# Patient Record
Sex: FEMALE | Race: OTHER | HISPANIC OR LATINO | Employment: PART TIME | URBAN - METROPOLITAN AREA
[De-identification: names, ages, dates, MRNs, and addresses within clinical notes are randomized per-mention and may not be internally consistent; named-entity substitution may affect disease eponyms.]

---

## 2023-11-14 ENCOUNTER — HOSPITAL ENCOUNTER (OUTPATIENT)
Facility: HOSPITAL | Age: 38
Discharge: HOME | End: 2023-11-16
Attending: SURGERY | Admitting: SURGERY
Payer: COMMERCIAL

## 2023-11-14 DIAGNOSIS — R05.9 COUGH, UNSPECIFIED TYPE: Primary | ICD-10-CM

## 2023-11-14 DIAGNOSIS — R10.13 EPIGASTRIC ABDOMINAL PAIN: ICD-10-CM

## 2023-11-14 DIAGNOSIS — K80.00 CALCULUS OF GALLBLADDER WITH ACUTE CHOLECYSTITIS WITHOUT OBSTRUCTION: ICD-10-CM

## 2023-11-14 LAB
ALBUMIN SERPL-MCNC: 3.4 G/DL (ref 3.5–5)
ALBUMIN/GLOB SERPL: 1 RATIO (ref 1.1–2)
ALP SERPL-CCNC: 114 UNIT/L (ref 40–150)
ALT SERPL-CCNC: 18 UNIT/L (ref 0–55)
APPEARANCE UR: CLEAR
AST SERPL-CCNC: 13 UNIT/L (ref 5–34)
B-HCG UR QL: NEGATIVE
BACTERIA #/AREA URNS AUTO: ABNORMAL /HPF
BASOPHILS # BLD AUTO: 0.04 X10(3)/MCL
BASOPHILS NFR BLD AUTO: 0.3 %
BILIRUB SERPL-MCNC: 0.5 MG/DL
BILIRUB UR QL STRIP.AUTO: NEGATIVE
BUN SERPL-MCNC: 7.1 MG/DL (ref 7–18.7)
CALCIUM SERPL-MCNC: 9.4 MG/DL (ref 8.4–10.2)
CHLORIDE SERPL-SCNC: 106 MMOL/L (ref 98–107)
CO2 SERPL-SCNC: 23 MMOL/L (ref 22–29)
COLOR UR AUTO: ABNORMAL
CREAT SERPL-MCNC: 0.64 MG/DL (ref 0.55–1.02)
CTP QC/QA: YES
EOSINOPHIL # BLD AUTO: 0.33 X10(3)/MCL (ref 0–0.9)
EOSINOPHIL NFR BLD AUTO: 2.7 %
ERYTHROCYTE [DISTWIDTH] IN BLOOD BY AUTOMATED COUNT: 12.1 % (ref 11.5–17)
GFR SERPLBLD CREATININE-BSD FMLA CKD-EPI: >60 MLS/MIN/1.73/M2
GLOBULIN SER-MCNC: 3.3 GM/DL (ref 2.4–3.5)
GLUCOSE SERPL-MCNC: 147 MG/DL (ref 74–100)
GLUCOSE UR QL STRIP.AUTO: NORMAL
HCT VFR BLD AUTO: 40.3 % (ref 37–47)
HGB BLD-MCNC: 12.8 G/DL (ref 12–16)
HYALINE CASTS #/AREA URNS LPF: ABNORMAL /LPF
IMM GRANULOCYTES # BLD AUTO: 0.05 X10(3)/MCL (ref 0–0.04)
IMM GRANULOCYTES NFR BLD AUTO: 0.4 %
KETONES UR QL STRIP.AUTO: NEGATIVE
LEUKOCYTE ESTERASE UR QL STRIP.AUTO: NEGATIVE
LIPASE SERPL-CCNC: 10 U/L
LYMPHOCYTES # BLD AUTO: 1.72 X10(3)/MCL (ref 0.6–4.6)
LYMPHOCYTES NFR BLD AUTO: 13.9 %
MCH RBC QN AUTO: 26.2 PG (ref 27–31)
MCHC RBC AUTO-ENTMCNC: 31.8 G/DL (ref 33–36)
MCV RBC AUTO: 82.4 FL (ref 80–94)
MONOCYTES # BLD AUTO: 0.73 X10(3)/MCL (ref 0.1–1.3)
MONOCYTES NFR BLD AUTO: 5.9 %
MUCOUS THREADS URNS QL MICRO: ABNORMAL /LPF
NEUTROPHILS # BLD AUTO: 9.48 X10(3)/MCL (ref 2.1–9.2)
NEUTROPHILS NFR BLD AUTO: 76.8 %
NITRITE UR QL STRIP.AUTO: NEGATIVE
NRBC BLD AUTO-RTO: 0 %
PH UR STRIP.AUTO: 5 [PH]
PLATELET # BLD AUTO: 186 X10(3)/MCL (ref 130–400)
PMV BLD AUTO: 12.8 FL (ref 7.4–10.4)
POTASSIUM SERPL-SCNC: 3.2 MMOL/L (ref 3.5–5.1)
PROT SERPL-MCNC: 6.7 GM/DL (ref 6.4–8.3)
PROT UR QL STRIP.AUTO: NEGATIVE
RBC # BLD AUTO: 4.89 X10(6)/MCL (ref 4.2–5.4)
RBC #/AREA URNS AUTO: ABNORMAL /HPF
RBC UR QL AUTO: ABNORMAL
SODIUM SERPL-SCNC: 139 MMOL/L (ref 136–145)
SP GR UR STRIP.AUTO: 1.02 (ref 1–1.03)
SQUAMOUS #/AREA URNS LPF: ABNORMAL /HPF
TROPONIN I SERPL-MCNC: <0.01 NG/ML (ref 0–0.04)
TSH SERPL-ACNC: <0.008 UIU/ML (ref 0.35–4.94)
UROBILINOGEN UR STRIP-ACNC: NORMAL
WBC # SPEC AUTO: 12.35 X10(3)/MCL (ref 4.5–11.5)
WBC #/AREA URNS AUTO: ABNORMAL /HPF

## 2023-11-14 PROCEDURE — 87651 STREP A DNA AMP PROBE: CPT | Performed by: PHYSICIAN ASSISTANT

## 2023-11-14 PROCEDURE — 63600175 PHARM REV CODE 636 W HCPCS: Performed by: PHYSICIAN ASSISTANT

## 2023-11-14 PROCEDURE — 96375 TX/PRO/DX INJ NEW DRUG ADDON: CPT

## 2023-11-14 PROCEDURE — 93005 ELECTROCARDIOGRAM TRACING: CPT

## 2023-11-14 PROCEDURE — 80053 COMPREHEN METABOLIC PANEL: CPT | Performed by: PHYSICIAN ASSISTANT

## 2023-11-14 PROCEDURE — 99285 EMERGENCY DEPT VISIT HI MDM: CPT | Mod: 25

## 2023-11-14 PROCEDURE — 84443 ASSAY THYROID STIM HORMONE: CPT | Performed by: PHYSICIAN ASSISTANT

## 2023-11-14 PROCEDURE — 83690 ASSAY OF LIPASE: CPT | Performed by: PHYSICIAN ASSISTANT

## 2023-11-14 PROCEDURE — 85025 COMPLETE CBC W/AUTO DIFF WBC: CPT | Performed by: PHYSICIAN ASSISTANT

## 2023-11-14 PROCEDURE — 86308 HETEROPHILE ANTIBODY SCREEN: CPT | Performed by: PHYSICIAN ASSISTANT

## 2023-11-14 PROCEDURE — 85610 PROTHROMBIN TIME: CPT | Performed by: PHYSICIAN ASSISTANT

## 2023-11-14 PROCEDURE — 25000003 PHARM REV CODE 250: Performed by: PHYSICIAN ASSISTANT

## 2023-11-14 PROCEDURE — 84484 ASSAY OF TROPONIN QUANT: CPT | Performed by: PHYSICIAN ASSISTANT

## 2023-11-14 PROCEDURE — 0241U COVID/RSV/FLU A&B PCR: CPT | Performed by: PHYSICIAN ASSISTANT

## 2023-11-14 PROCEDURE — 84439 ASSAY OF FREE THYROXINE: CPT | Performed by: PHYSICIAN ASSISTANT

## 2023-11-14 PROCEDURE — 81025 URINE PREGNANCY TEST: CPT | Performed by: PHYSICIAN ASSISTANT

## 2023-11-14 PROCEDURE — 96361 HYDRATE IV INFUSION ADD-ON: CPT

## 2023-11-14 PROCEDURE — 81001 URINALYSIS AUTO W/SCOPE: CPT | Performed by: PHYSICIAN ASSISTANT

## 2023-11-14 RX ORDER — ALBUTEROL SULFATE 90 UG/1
2 AEROSOL, METERED RESPIRATORY (INHALATION) EVERY 4 HOURS PRN
COMMUNITY
Start: 2023-11-05 | End: 2023-11-15

## 2023-11-14 RX ORDER — OXYBUTYNIN CHLORIDE 10 MG/1
10 TABLET, EXTENDED RELEASE ORAL 2 TIMES DAILY
COMMUNITY

## 2023-11-14 RX ORDER — KETOROLAC TROMETHAMINE 30 MG/ML
15 INJECTION, SOLUTION INTRAMUSCULAR; INTRAVENOUS
Status: COMPLETED | OUTPATIENT
Start: 2023-11-14 | End: 2023-11-14

## 2023-11-14 RX ORDER — BENZONATATE 100 MG/1
100 CAPSULE ORAL 3 TIMES DAILY PRN
COMMUNITY

## 2023-11-14 RX ORDER — ONDANSETRON 2 MG/ML
4 INJECTION INTRAMUSCULAR; INTRAVENOUS
Status: COMPLETED | OUTPATIENT
Start: 2023-11-14 | End: 2023-11-14

## 2023-11-14 RX ORDER — METHIMAZOLE 10 MG/1
5 TABLET ORAL 3 TIMES DAILY
COMMUNITY

## 2023-11-14 RX ADMIN — ONDANSETRON 4 MG: 2 INJECTION INTRAMUSCULAR; INTRAVENOUS at 11:11

## 2023-11-14 RX ADMIN — KETOROLAC TROMETHAMINE 15 MG: 30 INJECTION, SOLUTION INTRAMUSCULAR; INTRAVENOUS at 11:11

## 2023-11-14 RX ADMIN — SODIUM CHLORIDE 1000 ML: 9 INJECTION, SOLUTION INTRAVENOUS at 11:11

## 2023-11-15 ENCOUNTER — ANESTHESIA EVENT (OUTPATIENT)
Dept: SURGERY | Facility: HOSPITAL | Age: 38
End: 2023-11-15
Payer: COMMERCIAL

## 2023-11-15 ENCOUNTER — ANESTHESIA (OUTPATIENT)
Dept: SURGERY | Facility: HOSPITAL | Age: 38
End: 2023-11-15
Payer: COMMERCIAL

## 2023-11-15 LAB
ALBUMIN SERPL-MCNC: 3.2 G/DL (ref 3.5–5)
ALBUMIN/GLOB SERPL: 1.1 RATIO (ref 1.1–2)
ALP SERPL-CCNC: 106 UNIT/L (ref 40–150)
ALT SERPL-CCNC: 16 UNIT/L (ref 0–55)
AST SERPL-CCNC: 12 UNIT/L (ref 5–34)
BASOPHILS # BLD AUTO: 0.03 X10(3)/MCL
BASOPHILS NFR BLD AUTO: 0.3 %
BILIRUB SERPL-MCNC: 0.8 MG/DL
BUN SERPL-MCNC: 7.4 MG/DL (ref 7–18.7)
CALCIUM SERPL-MCNC: 9.1 MG/DL (ref 8.4–10.2)
CHLORIDE SERPL-SCNC: 111 MMOL/L (ref 98–107)
CO2 SERPL-SCNC: 23 MMOL/L (ref 22–29)
CREAT SERPL-MCNC: 0.66 MG/DL (ref 0.55–1.02)
EOSINOPHIL # BLD AUTO: 0.36 X10(3)/MCL (ref 0–0.9)
EOSINOPHIL NFR BLD AUTO: 3.4 %
ERYTHROCYTE [DISTWIDTH] IN BLOOD BY AUTOMATED COUNT: 12.3 % (ref 11.5–17)
FLUAV AG UPPER RESP QL IA.RAPID: NOT DETECTED
FLUBV AG UPPER RESP QL IA.RAPID: NOT DETECTED
GFR SERPLBLD CREATININE-BSD FMLA CKD-EPI: >60 MLS/MIN/1.73/M2
GLOBULIN SER-MCNC: 3 GM/DL (ref 2.4–3.5)
GLUCOSE SERPL-MCNC: 121 MG/DL (ref 74–100)
HCT VFR BLD AUTO: 38.2 % (ref 37–47)
HGB BLD-MCNC: 11.9 G/DL (ref 12–16)
HOLD SPECIMEN: NORMAL
IMM GRANULOCYTES # BLD AUTO: 0.03 X10(3)/MCL (ref 0–0.04)
IMM GRANULOCYTES NFR BLD AUTO: 0.3 %
INR PPP: 1
LYMPHOCYTES # BLD AUTO: 1.61 X10(3)/MCL (ref 0.6–4.6)
LYMPHOCYTES NFR BLD AUTO: 15 %
MCH RBC QN AUTO: 25.9 PG (ref 27–31)
MCHC RBC AUTO-ENTMCNC: 31.2 G/DL (ref 33–36)
MCV RBC AUTO: 83.2 FL (ref 80–94)
MONO SCR (OHS): NEGATIVE
MONOCYTES # BLD AUTO: 0.86 X10(3)/MCL (ref 0.1–1.3)
MONOCYTES NFR BLD AUTO: 8 %
NEUTROPHILS # BLD AUTO: 7.83 X10(3)/MCL (ref 2.1–9.2)
NEUTROPHILS NFR BLD AUTO: 73 %
NRBC BLD AUTO-RTO: 0 %
PLATELET # BLD AUTO: 176 X10(3)/MCL (ref 130–400)
PMV BLD AUTO: 12.7 FL (ref 7.4–10.4)
POTASSIUM SERPL-SCNC: 4.1 MMOL/L (ref 3.5–5.1)
PROT SERPL-MCNC: 6.2 GM/DL (ref 6.4–8.3)
PROTHROMBIN TIME: 13.3 SECONDS (ref 11.4–14)
RBC # BLD AUTO: 4.59 X10(6)/MCL (ref 4.2–5.4)
RSV A 5' UTR RNA NPH QL NAA+PROBE: NOT DETECTED
SARS-COV-2 RNA RESP QL NAA+PROBE: NOT DETECTED
SODIUM SERPL-SCNC: 141 MMOL/L (ref 136–145)
STREP A PCR (OHS): NOT DETECTED
T4 FREE SERPL-MCNC: 1.77 NG/DL (ref 0.7–1.48)
WBC # SPEC AUTO: 10.72 X10(3)/MCL (ref 4.5–11.5)

## 2023-11-15 PROCEDURE — D9220A PRA ANESTHESIA: Mod: ANES,,, | Performed by: ANESTHESIOLOGY

## 2023-11-15 PROCEDURE — 96376 TX/PRO/DX INJ SAME DRUG ADON: CPT | Mod: 59

## 2023-11-15 PROCEDURE — 63600175 PHARM REV CODE 636 W HCPCS

## 2023-11-15 PROCEDURE — 36000709 HC OR TIME LEV III EA ADD 15 MIN: Performed by: SURGERY

## 2023-11-15 PROCEDURE — 87040 BLOOD CULTURE FOR BACTERIA: CPT | Performed by: STUDENT IN AN ORGANIZED HEALTH CARE EDUCATION/TRAINING PROGRAM

## 2023-11-15 PROCEDURE — 63600175 PHARM REV CODE 636 W HCPCS: Performed by: STUDENT IN AN ORGANIZED HEALTH CARE EDUCATION/TRAINING PROGRAM

## 2023-11-15 PROCEDURE — 63600175 PHARM REV CODE 636 W HCPCS: Performed by: NURSE ANESTHETIST, CERTIFIED REGISTERED

## 2023-11-15 PROCEDURE — D9220A PRA ANESTHESIA: ICD-10-PCS | Mod: ANES,,, | Performed by: ANESTHESIOLOGY

## 2023-11-15 PROCEDURE — G0378 HOSPITAL OBSERVATION PER HR: HCPCS

## 2023-11-15 PROCEDURE — 96372 THER/PROPH/DIAG INJ SC/IM: CPT | Performed by: STUDENT IN AN ORGANIZED HEALTH CARE EDUCATION/TRAINING PROGRAM

## 2023-11-15 PROCEDURE — 96365 THER/PROPH/DIAG IV INF INIT: CPT

## 2023-11-15 PROCEDURE — 25000003 PHARM REV CODE 250: Performed by: SURGERY

## 2023-11-15 PROCEDURE — 25000003 PHARM REV CODE 250: Performed by: STUDENT IN AN ORGANIZED HEALTH CARE EDUCATION/TRAINING PROGRAM

## 2023-11-15 PROCEDURE — 37000009 HC ANESTHESIA EA ADD 15 MINS: Performed by: SURGERY

## 2023-11-15 PROCEDURE — 85025 COMPLETE CBC W/AUTO DIFF WBC: CPT | Performed by: STUDENT IN AN ORGANIZED HEALTH CARE EDUCATION/TRAINING PROGRAM

## 2023-11-15 PROCEDURE — 96366 THER/PROPH/DIAG IV INF ADDON: CPT | Mod: 59

## 2023-11-15 PROCEDURE — 36000708 HC OR TIME LEV III 1ST 15 MIN: Performed by: SURGERY

## 2023-11-15 PROCEDURE — 25000003 PHARM REV CODE 250: Performed by: NURSE ANESTHETIST, CERTIFIED REGISTERED

## 2023-11-15 PROCEDURE — 96375 TX/PRO/DX INJ NEW DRUG ADDON: CPT

## 2023-11-15 PROCEDURE — D9220A PRA ANESTHESIA: ICD-10-PCS | Mod: CRNA,,, | Performed by: NURSE ANESTHETIST, CERTIFIED REGISTERED

## 2023-11-15 PROCEDURE — 71000033 HC RECOVERY, INTIAL HOUR: Performed by: SURGERY

## 2023-11-15 PROCEDURE — 96361 HYDRATE IV INFUSION ADD-ON: CPT | Mod: 59

## 2023-11-15 PROCEDURE — 27201423 OPTIME MED/SURG SUP & DEVICES STERILE SUPPLY: Performed by: SURGERY

## 2023-11-15 PROCEDURE — 88304 TISSUE EXAM BY PATHOLOGIST: CPT | Mod: TC | Performed by: SURGERY

## 2023-11-15 PROCEDURE — 94761 N-INVAS EAR/PLS OXIMETRY MLT: CPT

## 2023-11-15 PROCEDURE — 63600175 PHARM REV CODE 636 W HCPCS: Performed by: SURGERY

## 2023-11-15 PROCEDURE — 96361 HYDRATE IV INFUSION ADD-ON: CPT

## 2023-11-15 PROCEDURE — 99900035 HC TECH TIME PER 15 MIN (STAT)

## 2023-11-15 PROCEDURE — 37000008 HC ANESTHESIA 1ST 15 MINUTES: Performed by: SURGERY

## 2023-11-15 PROCEDURE — D9220A PRA ANESTHESIA: Mod: CRNA,,, | Performed by: NURSE ANESTHETIST, CERTIFIED REGISTERED

## 2023-11-15 PROCEDURE — 96372 THER/PROPH/DIAG INJ SC/IM: CPT | Performed by: PHYSICIAN ASSISTANT

## 2023-11-15 PROCEDURE — 87040 BLOOD CULTURE FOR BACTERIA: CPT | Mod: 91 | Performed by: SURGERY

## 2023-11-15 PROCEDURE — 63600175 PHARM REV CODE 636 W HCPCS: Performed by: PHYSICIAN ASSISTANT

## 2023-11-15 PROCEDURE — C9113 INJ PANTOPRAZOLE SODIUM, VIA: HCPCS

## 2023-11-15 PROCEDURE — 80053 COMPREHEN METABOLIC PANEL: CPT | Performed by: STUDENT IN AN ORGANIZED HEALTH CARE EDUCATION/TRAINING PROGRAM

## 2023-11-15 PROCEDURE — 63600175 PHARM REV CODE 636 W HCPCS: Performed by: ANESTHESIOLOGY

## 2023-11-15 RX ORDER — PROMETHAZINE HYDROCHLORIDE 25 MG/ML
INJECTION, SOLUTION INTRAMUSCULAR; INTRAVENOUS
Status: COMPLETED
Start: 2023-11-15 | End: 2023-11-15

## 2023-11-15 RX ORDER — ONDANSETRON 4 MG/1
8 TABLET, ORALLY DISINTEGRATING ORAL EVERY 8 HOURS PRN
Status: DISCONTINUED | OUTPATIENT
Start: 2023-11-15 | End: 2023-11-16 | Stop reason: HOSPADM

## 2023-11-15 RX ORDER — BUPIVACAINE HYDROCHLORIDE 2.5 MG/ML
INJECTION, SOLUTION EPIDURAL; INFILTRATION; INTRACAUDAL
Status: DISCONTINUED | OUTPATIENT
Start: 2023-11-15 | End: 2023-11-15 | Stop reason: HOSPADM

## 2023-11-15 RX ORDER — HEPARIN SODIUM 5000 [USP'U]/ML
5000 INJECTION, SOLUTION INTRAVENOUS; SUBCUTANEOUS ONCE
Status: COMPLETED | OUTPATIENT
Start: 2023-11-15 | End: 2023-11-15

## 2023-11-15 RX ORDER — DICYCLOMINE HYDROCHLORIDE 10 MG/ML
20 INJECTION INTRAMUSCULAR
Status: COMPLETED | OUTPATIENT
Start: 2023-11-15 | End: 2023-11-15

## 2023-11-15 RX ORDER — CEFAZOLIN SODIUM 1 G/3ML
INJECTION, POWDER, FOR SOLUTION INTRAMUSCULAR; INTRAVENOUS
Status: DISCONTINUED | OUTPATIENT
Start: 2023-11-15 | End: 2023-11-15

## 2023-11-15 RX ORDER — LIDOCAINE HYDROCHLORIDE 20 MG/ML
INJECTION, SOLUTION EPIDURAL; INFILTRATION; INTRACAUDAL; PERINEURAL
Status: DISCONTINUED | OUTPATIENT
Start: 2023-11-15 | End: 2023-11-15

## 2023-11-15 RX ORDER — MORPHINE SULFATE 2 MG/ML
2 INJECTION, SOLUTION INTRAMUSCULAR; INTRAVENOUS EVERY 5 MIN PRN
Status: DISCONTINUED | OUTPATIENT
Start: 2023-11-15 | End: 2023-11-16

## 2023-11-15 RX ORDER — SODIUM CHLORIDE 0.9 % (FLUSH) 0.9 %
10 SYRINGE (ML) INJECTION
Status: DISCONTINUED | OUTPATIENT
Start: 2023-11-15 | End: 2023-11-16 | Stop reason: HOSPADM

## 2023-11-15 RX ORDER — KETOROLAC TROMETHAMINE 30 MG/ML
INJECTION, SOLUTION INTRAMUSCULAR; INTRAVENOUS
Status: DISCONTINUED | OUTPATIENT
Start: 2023-11-15 | End: 2023-11-15

## 2023-11-15 RX ORDER — OXYCODONE HYDROCHLORIDE 5 MG/1
5 TABLET ORAL EVERY 4 HOURS PRN
Status: DISCONTINUED | OUTPATIENT
Start: 2023-11-15 | End: 2023-11-16 | Stop reason: HOSPADM

## 2023-11-15 RX ORDER — ONDANSETRON 2 MG/ML
4 INJECTION INTRAMUSCULAR; INTRAVENOUS EVERY 12 HOURS PRN
Status: DISCONTINUED | OUTPATIENT
Start: 2023-11-15 | End: 2023-11-16 | Stop reason: HOSPADM

## 2023-11-15 RX ORDER — ROCURONIUM BROMIDE 10 MG/ML
INJECTION, SOLUTION INTRAVENOUS
Status: DISCONTINUED | OUTPATIENT
Start: 2023-11-15 | End: 2023-11-15

## 2023-11-15 RX ORDER — SODIUM CHLORIDE 9 MG/ML
INJECTION, SOLUTION INTRAVENOUS
Status: DISCONTINUED | OUTPATIENT
Start: 2023-11-15 | End: 2023-11-16 | Stop reason: HOSPADM

## 2023-11-15 RX ORDER — ACETAMINOPHEN 325 MG/1
650 TABLET ORAL EVERY 4 HOURS
Status: DISCONTINUED | OUTPATIENT
Start: 2023-11-15 | End: 2023-11-16 | Stop reason: HOSPADM

## 2023-11-15 RX ORDER — FENTANYL CITRATE 50 UG/ML
INJECTION, SOLUTION INTRAMUSCULAR; INTRAVENOUS
Status: DISCONTINUED | OUTPATIENT
Start: 2023-11-15 | End: 2023-11-15

## 2023-11-15 RX ORDER — DEXAMETHASONE SODIUM PHOSPHATE 4 MG/ML
INJECTION, SOLUTION INTRA-ARTICULAR; INTRALESIONAL; INTRAMUSCULAR; INTRAVENOUS; SOFT TISSUE
Status: DISCONTINUED | OUTPATIENT
Start: 2023-11-15 | End: 2023-11-15

## 2023-11-15 RX ORDER — ONDANSETRON 2 MG/ML
4 INJECTION INTRAMUSCULAR; INTRAVENOUS DAILY PRN
Status: DISCONTINUED | OUTPATIENT
Start: 2023-11-15 | End: 2023-11-16 | Stop reason: HOSPADM

## 2023-11-15 RX ORDER — SODIUM CHLORIDE, SODIUM LACTATE, POTASSIUM CHLORIDE, CALCIUM CHLORIDE 600; 310; 30; 20 MG/100ML; MG/100ML; MG/100ML; MG/100ML
INJECTION, SOLUTION INTRAVENOUS CONTINUOUS
Status: ACTIVE | OUTPATIENT
Start: 2023-11-15 | End: 2023-11-15

## 2023-11-15 RX ORDER — PROPRANOLOL HYDROCHLORIDE 20 MG/1
60 TABLET ORAL 2 TIMES DAILY
Status: DISCONTINUED | OUTPATIENT
Start: 2023-11-15 | End: 2023-11-16 | Stop reason: HOSPADM

## 2023-11-15 RX ORDER — MEPERIDINE HYDROCHLORIDE 25 MG/ML
12.5 INJECTION INTRAMUSCULAR; INTRAVENOUS; SUBCUTANEOUS EVERY 10 MIN PRN
Status: DISPENSED | OUTPATIENT
Start: 2023-11-15 | End: 2023-11-15

## 2023-11-15 RX ORDER — GUAIFENESIN/DEXTROMETHORPHAN 100-10MG/5
5 SYRUP ORAL EVERY 4 HOURS PRN
Status: DISCONTINUED | OUTPATIENT
Start: 2023-11-15 | End: 2023-11-16 | Stop reason: HOSPADM

## 2023-11-15 RX ORDER — PROPOFOL 10 MG/ML
VIAL (ML) INTRAVENOUS
Status: DISCONTINUED | OUTPATIENT
Start: 2023-11-15 | End: 2023-11-15

## 2023-11-15 RX ORDER — OXYCODONE HYDROCHLORIDE 5 MG/1
5 TABLET ORAL
Status: DISCONTINUED | OUTPATIENT
Start: 2023-11-15 | End: 2023-11-16

## 2023-11-15 RX ORDER — ACETAMINOPHEN 325 MG/1
650 TABLET ORAL EVERY 8 HOURS PRN
Status: DISCONTINUED | OUTPATIENT
Start: 2023-11-15 | End: 2023-11-16 | Stop reason: HOSPADM

## 2023-11-15 RX ORDER — METHIMAZOLE 5 MG/1
5 TABLET ORAL 3 TIMES DAILY
Status: DISCONTINUED | OUTPATIENT
Start: 2023-11-15 | End: 2023-11-16 | Stop reason: HOSPADM

## 2023-11-15 RX ORDER — MIDAZOLAM HYDROCHLORIDE 1 MG/ML
2 INJECTION INTRAMUSCULAR; INTRAVENOUS ONCE AS NEEDED
Status: COMPLETED | OUTPATIENT
Start: 2023-11-15 | End: 2023-11-15

## 2023-11-15 RX ORDER — SODIUM CHLORIDE, SODIUM LACTATE, POTASSIUM CHLORIDE, CALCIUM CHLORIDE 600; 310; 30; 20 MG/100ML; MG/100ML; MG/100ML; MG/100ML
INJECTION, SOLUTION INTRAVENOUS CONTINUOUS
Status: DISCONTINUED | OUTPATIENT
Start: 2023-11-15 | End: 2023-11-15

## 2023-11-15 RX ORDER — MORPHINE SULFATE 2 MG/ML
INJECTION, SOLUTION INTRAMUSCULAR; INTRAVENOUS
Status: COMPLETED
Start: 2023-11-15 | End: 2023-11-15

## 2023-11-15 RX ORDER — TALC
6 POWDER (GRAM) TOPICAL NIGHTLY PRN
Status: DISCONTINUED | OUTPATIENT
Start: 2023-11-15 | End: 2023-11-16 | Stop reason: HOSPADM

## 2023-11-15 RX ORDER — PANTOPRAZOLE SODIUM 40 MG/10ML
40 INJECTION, POWDER, LYOPHILIZED, FOR SOLUTION INTRAVENOUS DAILY
Status: DISCONTINUED | OUTPATIENT
Start: 2023-11-15 | End: 2023-11-16 | Stop reason: HOSPADM

## 2023-11-15 RX ORDER — OXYBUTYNIN CHLORIDE 5 MG/1
10 TABLET, EXTENDED RELEASE ORAL 2 TIMES DAILY
Status: DISCONTINUED | OUTPATIENT
Start: 2023-11-15 | End: 2023-11-16 | Stop reason: HOSPADM

## 2023-11-15 RX ORDER — CEFAZOLIN SODIUM 2 G/50ML
2 SOLUTION INTRAVENOUS ONCE
Status: COMPLETED | OUTPATIENT
Start: 2023-11-15 | End: 2023-11-15

## 2023-11-15 RX ORDER — PROMETHAZINE HYDROCHLORIDE 25 MG/ML
12.5 INJECTION, SOLUTION INTRAMUSCULAR; INTRAVENOUS ONCE
Status: COMPLETED | OUTPATIENT
Start: 2023-11-15 | End: 2023-11-15

## 2023-11-15 RX ORDER — POTASSIUM CHLORIDE 20 MEQ/1
40 TABLET, EXTENDED RELEASE ORAL 2 TIMES DAILY
Status: COMPLETED | OUTPATIENT
Start: 2023-11-15 | End: 2023-11-15

## 2023-11-15 RX ORDER — ENOXAPARIN SODIUM 100 MG/ML
40 INJECTION SUBCUTANEOUS EVERY 24 HOURS
Status: DISCONTINUED | OUTPATIENT
Start: 2023-11-15 | End: 2023-11-16 | Stop reason: HOSPADM

## 2023-11-15 RX ORDER — TRAMADOL HYDROCHLORIDE 50 MG/1
50 TABLET ORAL EVERY 4 HOURS PRN
Status: DISCONTINUED | OUTPATIENT
Start: 2023-11-15 | End: 2023-11-16 | Stop reason: HOSPADM

## 2023-11-15 RX ORDER — ALBUTEROL SULFATE 90 UG/1
2 AEROSOL, METERED RESPIRATORY (INHALATION) EVERY 4 HOURS PRN
Status: DISCONTINUED | OUTPATIENT
Start: 2023-11-15 | End: 2023-11-16 | Stop reason: HOSPADM

## 2023-11-15 RX ADMIN — PROPOFOL 200 MG: 10 INJECTION, EMULSION INTRAVENOUS at 01:11

## 2023-11-15 RX ADMIN — PROMETHAZINE HYDROCHLORIDE 12.5 MG: 25 INJECTION, SOLUTION INTRAMUSCULAR; INTRAVENOUS at 02:11

## 2023-11-15 RX ADMIN — SODIUM CHLORIDE, POTASSIUM CHLORIDE, SODIUM LACTATE AND CALCIUM CHLORIDE: 600; 310; 30; 20 INJECTION, SOLUTION INTRAVENOUS at 12:11

## 2023-11-15 RX ADMIN — METHIMAZOLE 5 MG: 5 TABLET ORAL at 09:11

## 2023-11-15 RX ADMIN — TRAMADOL HYDROCHLORIDE 50 MG: 50 TABLET, COATED ORAL at 07:11

## 2023-11-15 RX ADMIN — CEFAZOLIN 2 G: 330 INJECTION, POWDER, FOR SOLUTION INTRAMUSCULAR; INTRAVENOUS at 01:11

## 2023-11-15 RX ADMIN — ACETAMINOPHEN 650 MG: 325 TABLET, FILM COATED ORAL at 05:11

## 2023-11-15 RX ADMIN — ENOXAPARIN SODIUM 40 MG: 40 INJECTION SUBCUTANEOUS at 05:11

## 2023-11-15 RX ADMIN — SUGAMMADEX 200 MG: 100 INJECTION, SOLUTION INTRAVENOUS at 02:11

## 2023-11-15 RX ADMIN — LIDOCAINE HYDROCHLORIDE 50 MG: 20 INJECTION, SOLUTION EPIDURAL; INFILTRATION; INTRACAUDAL; PERINEURAL at 01:11

## 2023-11-15 RX ADMIN — DEXAMETHASONE SODIUM PHOSPHATE 8 MG: 4 INJECTION, SOLUTION INTRA-ARTICULAR; INTRALESIONAL; INTRAMUSCULAR; INTRAVENOUS; SOFT TISSUE at 01:11

## 2023-11-15 RX ADMIN — SODIUM CHLORIDE, POTASSIUM CHLORIDE, SODIUM LACTATE AND CALCIUM CHLORIDE: 600; 310; 30; 20 INJECTION, SOLUTION INTRAVENOUS at 03:11

## 2023-11-15 RX ADMIN — PROMETHAZINE HYDROCHLORIDE 12.5 MG: 25 INJECTION INTRAMUSCULAR; INTRAVENOUS at 02:11

## 2023-11-15 RX ADMIN — POTASSIUM CHLORIDE 40 MEQ: 1500 TABLET, EXTENDED RELEASE ORAL at 09:11

## 2023-11-15 RX ADMIN — SODIUM CHLORIDE, POTASSIUM CHLORIDE, SODIUM LACTATE AND CALCIUM CHLORIDE: 600; 310; 30; 20 INJECTION, SOLUTION INTRAVENOUS at 02:11

## 2023-11-15 RX ADMIN — POTASSIUM CHLORIDE 40 MEQ: 1500 TABLET, EXTENDED RELEASE ORAL at 08:11

## 2023-11-15 RX ADMIN — ACETAMINOPHEN 650 MG: 325 TABLET, FILM COATED ORAL at 02:11

## 2023-11-15 RX ADMIN — PANTOPRAZOLE SODIUM 40 MG: 40 INJECTION, POWDER, FOR SOLUTION INTRAVENOUS at 08:11

## 2023-11-15 RX ADMIN — KETOROLAC TROMETHAMINE 30 MG: 30 INJECTION, SOLUTION INTRAMUSCULAR at 01:11

## 2023-11-15 RX ADMIN — MEPERIDINE HYDROCHLORIDE 12.5 MG: 25 INJECTION INTRAMUSCULAR; INTRAVENOUS; SUBCUTANEOUS at 02:11

## 2023-11-15 RX ADMIN — SODIUM CHLORIDE: 9 INJECTION, SOLUTION INTRAVENOUS at 04:11

## 2023-11-15 RX ADMIN — OXYBUTYNIN CHLORIDE 10 MG: 5 TABLET, EXTENDED RELEASE ORAL at 04:11

## 2023-11-15 RX ADMIN — ACETAMINOPHEN 650 MG: 325 TABLET, FILM COATED ORAL at 09:11

## 2023-11-15 RX ADMIN — PIPERACILLIN AND TAZOBACTAM 4.5 G: 4; .5 INJECTION, POWDER, LYOPHILIZED, FOR SOLUTION INTRAVENOUS; PARENTERAL at 04:11

## 2023-11-15 RX ADMIN — MORPHINE SULFATE 2 MG: 2 INJECTION, SOLUTION INTRAMUSCULAR; INTRAVENOUS at 02:11

## 2023-11-15 RX ADMIN — PROPRANOLOL HYDROCHLORIDE 60 MG: 20 TABLET ORAL at 09:11

## 2023-11-15 RX ADMIN — CEFAZOLIN SODIUM 2 G: 2 SOLUTION INTRAVENOUS at 05:11

## 2023-11-15 RX ADMIN — ONDANSETRON 4 MG: 2 INJECTION INTRAMUSCULAR; INTRAVENOUS at 01:11

## 2023-11-15 RX ADMIN — MIDAZOLAM HYDROCHLORIDE 2 MG: 1 INJECTION, SOLUTION INTRAMUSCULAR; INTRAVENOUS at 12:11

## 2023-11-15 RX ADMIN — OXYCODONE HYDROCHLORIDE 5 MG: 5 TABLET ORAL at 09:11

## 2023-11-15 RX ADMIN — SODIUM CHLORIDE, POTASSIUM CHLORIDE, SODIUM LACTATE AND CALCIUM CHLORIDE 1000 ML: 600; 310; 30; 20 INJECTION, SOLUTION INTRAVENOUS at 03:11

## 2023-11-15 RX ADMIN — FENTANYL CITRATE 100 MCG: 50 INJECTION, SOLUTION INTRAMUSCULAR; INTRAVENOUS at 01:11

## 2023-11-15 RX ADMIN — OXYBUTYNIN CHLORIDE 10 MG: 5 TABLET, EXTENDED RELEASE ORAL at 09:11

## 2023-11-15 RX ADMIN — DICYCLOMINE HYDROCHLORIDE 20 MG: 20 INJECTION, SOLUTION INTRAMUSCULAR at 12:11

## 2023-11-15 RX ADMIN — SODIUM CHLORIDE, POTASSIUM CHLORIDE, SODIUM LACTATE AND CALCIUM CHLORIDE: 600; 310; 30; 20 INJECTION, SOLUTION INTRAVENOUS at 09:11

## 2023-11-15 RX ADMIN — ROCURONIUM BROMIDE 50 MG: 10 INJECTION INTRAVENOUS at 01:11

## 2023-11-15 RX ADMIN — HEPARIN SODIUM 5000 UNITS: 5000 INJECTION, SOLUTION INTRAVENOUS; SUBCUTANEOUS at 12:11

## 2023-11-15 NOTE — H&P
[facility]/General Surgery  CONSULT NOTE  Chief Complaint:   Abdominal Pain/ Cough     History of Present Illness:  Apoorva Garcia is a 38 y.o. female with PMHx of hyperthyroid and H.Pylori who presents with a one day hx of acute onset abdominal pain right after breakfast without associated N/V. Pt also c/o of a 2 week long hx of cough. US performed which showed 0.9cm stone in the neck of the GB with reported + Dunham sign. PT has had two c-sections but no other Past surgical History. Denies Fever, chills, diarrhea, constipation.     Review of Systems:  Negative other than stated in HPI on 10 point review of systems.     Allergies:  Review of patient's allergies indicates:   Allergen Reactions    Cefdinir Rash       Home Medications:  No current facility-administered medications on file prior to encounter.     Current Outpatient Medications on File Prior to Encounter   Medication Sig    albuterol (PROVENTIL/VENTOLIN HFA) 90 mcg/actuation inhaler Inhale 2 puffs into the lungs every 4 (four) hours as needed.    benzonatate (TESSALON) 100 MG capsule Take 100 mg by mouth 3 (three) times daily as needed for Cough.    methIMAzole (TAPAZOLE) 10 MG Tab Take 5 mg by mouth 3 (three) times daily.    oxybutynin (DITROPAN-XL) 10 MG 24 hr tablet Take 10 mg by mouth 2 (two) times a day.    propranoloL 120 mg Cp24 Take by mouth.       Past Medical History:  History reviewed. No pertinent past medical history.  Hypothyroidism   H. Pylori (Tested + in , finished triple therapy)     Past Surgical History:  History reviewed. No pertinent surgical history.   x2 ()     Family History:   History reviewed. No pertinent family history.    Social History:   Social History     Tobacco Use    Smoking status: Never    Smokeless tobacco: Never   Substance Use Topics    Alcohol use: Never    Drug use: Not Currently        Vital Signs:  Temp: 98.5 °F (36.9 °C) (11/15/23 0031)  Pulse: 81 (11/15/23 0104)  Resp: 18 (11/15/23  "0104)  BP: 117/60 (11/15/23 0104)  SpO2: 98 % (11/15/23 0104)    Physical Exam:   Gen: NAD, AAOx3, answering questions appropriately  HEENT: Atraumatic   CV: RR  Resp: NWOB  Abd: S, epigastric tenderness, Dunham Sign -, No rebound tenderness or guarding       Labs:  Renal:  Recent Labs     11/14/23 2256   BUN 7.1   CREATININE 0.64     No results for input(s): "LACTIC" in the last 72 hours.  FENGI:  Recent Labs     11/14/23 2256      K 3.2*   CO2 23   CALCIUM 9.4   ALBUMIN 3.4*   BILITOT 0.5   AST 13   ALKPHOS 114   ALT 18     Heme:  Recent Labs     11/14/23 2255   HGB 12.8   HCT 40.3        ID:  Recent Labs     11/14/23 2255   WBC 12.35*     CBG:  Recent Labs     11/14/23 2256   GLUCOSE 147*      Cardiovascular:  Recent Labs   Lab 11/14/23 2255   TROPONINI <0.010     I have reviewed all pertinent lab results within the past 24 hours.    Imaging:  RUQ US 11/15  START OF REPORT:   Technique: Limited right upper quadrant abdominal ultrasound was performed.     Comparison: None.     Clinical History: RUQ pain, positive dunham sign.     Findings:   Liver: Unremarkable with no intrahepatic duct dilatation. The liver measures 14.9 centimeters in greatest dimension and demonstrates slight increased echogenicity which may reflect fatty infiltration. Correlate with clinical and laboratory findings.   Mass: No discrete mass is seen.   Cyst: No definitive cyst is seen.   Biliary ducts: The common bile duct is within normal limits at 5.2 mm in diameter.   Gallbladder: There is a single gallstone in the gallbladder neck measuring 0.9 cm. The gallbladder wall measures 1.8 mm in thickness. There is no pericholecystic fluid. The sonographic [place] sign is positive. This could represent acute cholecystitis.   Pancreas: No mass, pancreatitis, or ductal dilatation.   Kidneys: No stones hydronephrosis or mass.   Right kidney:   Dimensions: The right kidney measures 10 sagittal by 4.8 by 4.8 in dimension and appears " unremarkable.   Vascular:   IVC: The IVC is within normal limits.       Impression:   1. There is a single gallstone in the gallbladder neck measuring 0.9 cm. The sonographic [place] sign is positive. This could represent acute cholecystitis.   I have reviewed all pertinent imaging results/findings within the past 24 hours.    Micro/Path/Other:  Microbiology Results (last 7 days)       ** No results found for the last 168 hours. **           Specimen (168h ago, onward)      None                 ASSESSMENT:     Apoorva Garcia is a 38 y.o. female with PMHx of hyperthyroidism and H.Pylori who presentes with one day of Epigastric/RUQ pain and cough w/ US showing .9cm stone in the neck of the GB. Pt w/ leukocytosis of 12 c/f early Acute cholecystitis     PLAN:     - Admit for obs   -Will plan on Lap marj today 11/16 pending OR availability   -NPO   -mIVF   -IV Zosyn   -PPI   -MM pain control     Franco Crockett MD  [facility] General Surgery PGY-1  2:27 AM

## 2023-11-15 NOTE — PROGRESS NOTES
11/15/23 1414   Missed Time Reason   OT Attempted Eval Date 11/15/23   OT Attempted Eval Time 1326   Missed Treatment Reason Off the floor for procedure/surgery

## 2023-11-15 NOTE — TRANSFER OF CARE
"Anesthesia Transfer of Care Note    Patient: Apoorva Garcia    Procedure(s) Performed: Procedure(s) (LRB):  CHOLECYSTECTOMY, LAPAROSCOPIC (N/A)    Patient location: PACU    Transport from OR: Transported from OR on room air with adequate spontaneous ventilation    Post pain: adequate analgesia    Post assessment: no apparent anesthetic complications    Post vital signs: stable    Level of consciousness: awake    Nausea/Vomiting: no nausea/vomiting    Complications: none    Transfer of care protocol was followed      Last vitals: Visit Vitals  /79   Pulse 60   Temp 36.3 °C (97.3 °F) (Temporal)   Resp 20   Ht 5' 6" (1.676 m)   Wt 70 kg (154 lb 5.2 oz)   LMP 11/07/2023 (Approximate)   SpO2 100%   Breastfeeding No   BMI 24.91 kg/m²     "

## 2023-11-15 NOTE — ANESTHESIA PREPROCEDURE EVALUATION
11/15/2023  Apoorva Garcia is a 38 y.o., female with PMHx of hyperthyroidism presents for laparoscopic cholecystectomy.    Propanolol--last dose    Active Ambulatory Problems     Diagnosis Date Noted    No Active Ambulatory Problems     Resolved Ambulatory Problems     Diagnosis Date Noted    No Resolved Ambulatory Problems     No Additional Past Medical History           Pre-op Assessment    I have reviewed the NPO Status.      Review of Systems  Anesthesia Hx:  No problems with previous Anesthesia                Social:  Non-Smoker       Cardiovascular:  Cardiovascular Normal                                            Pulmonary:  Pulmonary Normal                       Renal/:  Renal/ Normal                 Hepatic/GI:  Hepatic/GI Normal                 Neurological:  Neurology Normal                                      Endocrine:    Hyperthyroidism           Vitals:    11/15/23 0643 11/15/23 0717 11/15/23 1030 11/15/23 1214   BP: (!) 100/55  102/63 128/79   Pulse: 63 72 (!) 59 60   Resp:  18  20   Temp: 36.4 °C (97.5 °F)  36.4 °C (97.6 °F) 36.3 °C (97.3 °F)   TempSrc: Oral  Oral Temporal   SpO2: 99% 99% 100% 100%   Weight:       Height:             Physical Exam  General: Alert, Cooperative and Well nourished    Airway:  Mallampati: II   Mouth Opening: Normal  TM Distance: Normal  Tongue: Normal  Neck ROM: Normal ROM    Dental:  Intact    Chest/Lungs:  Normal Respiratory Rate, Clear to auscultation    Heart:  Rate: Normal  Rhythm: Regular Rhythm  Sounds: Normal       Latest Reference Range & Units 11/14/23 23:18   Preg Test, Ur Negative  Negative     Lab Results   Component Value Date    WBC 10.72 11/15/2023    HGB 11.9 (L) 11/15/2023    HCT 38.2 11/15/2023    MCV 83.2 11/15/2023     11/15/2023       CMP  Sodium Level   Date Value Ref Range Status   11/15/2023 141 136 - 145 mmol/L Final      Potassium Level   Date Value Ref Range Status   11/15/2023 4.1 3.5 - 5.1 mmol/L Final     Carbon Dioxide   Date Value Ref Range Status   11/15/2023 23 22 - 29 mmol/L Final     Blood Urea Nitrogen   Date Value Ref Range Status   11/15/2023 7.4 7.0 - 18.7 mg/dL Final     Creatinine   Date Value Ref Range Status   11/15/2023 0.66 0.55 - 1.02 mg/dL Final     Calcium Level Total   Date Value Ref Range Status   11/15/2023 9.1 8.4 - 10.2 mg/dL Final     Albumin Level   Date Value Ref Range Status   11/15/2023 3.2 (L) 3.5 - 5.0 g/dL Final     Bilirubin Total   Date Value Ref Range Status   11/15/2023 0.8 <=1.5 mg/dL Final     Alkaline Phosphatase   Date Value Ref Range Status   11/15/2023 106 40 - 150 unit/L Final     Aspartate Aminotransferase   Date Value Ref Range Status   11/15/2023 12 5 - 34 unit/L Final     Alanine Aminotransferase   Date Value Ref Range Status   11/15/2023 16 0 - 55 unit/L Final     eGFR   Date Value Ref Range Status   11/15/2023 >60 mls/min/1.73/m2 Final           Anesthesia Plan  Type of Anesthesia, risks & benefits discussed:    Anesthesia Type: Gen ETT  Intra-op Monitoring Plan: Standard ASA Monitors  Post Op Pain Control Plan: IV/PO Opioids PRN  Induction:  IV  Airway Plan: Direct  Informed Consent: Informed consent signed with the Patient and all parties understand the risks and agree with anesthesia plan.  All questions answered.   ASA Score: 2  Day of Surgery Review of History & Physical: H&P Update referred to the surgeon/provider.    Ready For Surgery From Anesthesia Perspective.     .

## 2023-11-15 NOTE — ED PROVIDER NOTES
Encounter Date: 11/14/2023       History     Chief Complaint   Patient presents with    Abdominal Pain     Epigastric abdominal pain that started today; Endorses NV    Cough     Has had a cough and sore throat for last week; Was seen at  but prescriptions have been ineffective     Patient reports to the emergency room with complaints of cough and sore throat for approximately 2 weeks; patient was reports she was seen November 3rd at a local urgent care and was prescribed multiple medications including amoxicillin for her throat; patient reports no improvements of these symptoms; patient also reports epigastric abdominal pain with associated nausea and vomiting starting earlier today at approximately 9:00 a.m.    The history is provided by the patient.   Abdominal Pain  The current episode started today. The onset of the illness was abrupt. The abdominal pain is located in the epigastric region. The severity of the abdominal pain is 5/10. The other symptoms of the illness include nausea and vomiting. The other symptoms of the illness do not include fever, shortness of breath or dysuria.   Nausea began today.   The vomiting began today.   Symptoms associated with the illness do not include back pain.   Cough  This is a recurrent problem. The current episode started several days ago. The problem has been unchanged. There has been no fever. Associated symptoms include sore throat. Pertinent negatives include no chest pain, no sweats and no shortness of breath.     Review of patient's allergies indicates:   Allergen Reactions    Cefdinir Rash     History reviewed. No pertinent past medical history.  History reviewed. No pertinent surgical history.  History reviewed. No pertinent family history.  Social History     Tobacco Use    Smoking status: Never    Smokeless tobacco: Never   Substance Use Topics    Alcohol use: Never    Drug use: Not Currently     Review of Systems   Constitutional:  Negative for fever.   HENT:   Positive for sore throat.    Eyes: Negative.    Respiratory:  Positive for cough. Negative for shortness of breath.    Cardiovascular:  Negative for chest pain.   Gastrointestinal:  Positive for abdominal pain, nausea and vomiting.   Genitourinary:  Negative for dysuria.   Musculoskeletal:  Negative for back pain.   Skin:  Negative for rash.   Neurological:  Negative for weakness.   Hematological:  Does not bruise/bleed easily.   Psychiatric/Behavioral: Negative.         Physical Exam     Initial Vitals [11/14/23 2234]   BP Pulse Resp Temp SpO2   118/72 99 18 98.3 °F (36.8 °C) 98 %      MAP       --         Physical Exam    Vitals reviewed.  Constitutional: She appears well-developed and well-nourished.   HENT:   Head: Normocephalic and atraumatic.   Mouth/Throat: Uvula is midline. Posterior oropharyngeal erythema present.   Eyes: Conjunctivae and EOM are normal. Pupils are equal, round, and reactive to light.   Neck: Neck supple.   Normal range of motion.  Cardiovascular:  Normal rate, regular rhythm and normal heart sounds.           Pulmonary/Chest: Breath sounds normal. No respiratory distress. She has no wheezes. She exhibits no tenderness.   Abdominal: Abdomen is soft. Bowel sounds are normal. There is abdominal tenderness in the epigastric area.     There is positive Dunham's sign.   Musculoskeletal:         General: Normal range of motion.      Cervical back: Normal range of motion and neck supple.     Neurological: She is alert and oriented to person, place, and time. She displays normal reflexes. No cranial nerve deficit or sensory deficit.   Skin: Skin is warm and dry.   Psychiatric: She has a normal mood and affect. Her behavior is normal. Judgment and thought content normal.         ED Course   Procedures  Labs Reviewed   COMPREHENSIVE METABOLIC PANEL - Abnormal; Notable for the following components:       Result Value    Potassium Level 3.2 (*)     Glucose Level 147 (*)     Albumin Level 3.4 (*)      Albumin/Globulin Ratio 1.0 (*)     All other components within normal limits   TSH - Abnormal; Notable for the following components:    TSH <0.008 (*)     All other components within normal limits   URINALYSIS, REFLEX TO URINE CULTURE - Abnormal; Notable for the following components:    Blood, UA Trace (*)     Squamous Epithelial Cells, UA Trace (*)     Mucous, UA Trace (*)     All other components within normal limits   CBC WITH DIFFERENTIAL - Abnormal; Notable for the following components:    WBC 12.35 (*)     MCH 26.2 (*)     MCHC 31.8 (*)     MPV 12.8 (*)     Neut # 9.48 (*)     IG# 0.05 (*)     All other components within normal limits   T4, FREE - Abnormal; Notable for the following components:    Thyroxine Free 1.77 (*)     All other components within normal limits   TROPONIN I - Normal   LIPASE - Normal   STREP GROUP A BY PCR - Normal    Narrative:     The Xpert Xpress Strep A test is a rapid, qualitative in vitro diagnostic test for the detection of Streptococcus pyogenes (Group A ß-hemolytic Streptococcus, Strep A) in throat swab specimens from patients with signs and symptoms of pharyngitis.     COVID/RSV/FLU A&B PCR - Normal    Narrative:     The Xpert Xpress SARS-CoV-2/FLU/RSV plus is a rapid, multiplexed real-time PCR test intended for the simultaneous qualitative detection and differentiation of SARS-CoV-2, Influenza A, Influenza B, and respiratory syncytial virus (RSV) viral RNA in either nasopharyngeal swab or nasal swab specimens.         MONONUCLEOSIS SCREEN - Normal   CBC W/ AUTO DIFFERENTIAL    Narrative:     The following orders were created for panel order CBC auto differential.  Procedure                               Abnormality         Status                     ---------                               -----------         ------                     CBC with Differential[[id number]]       Abnormal            Final result                 Please view results for these tests on the individual  orders.   PROTIME-INR   POCT URINE PREGNANCY          Imaging Results              US Abdomen Limited (Preliminary result)  Result time 11/15/23 01:40:47      Preliminary result by Frantz Zelaya Jr., MD (11/15/23 01:40:47)                   Narrative:    START OF REPORT:  Technique: Limited right upper quadrant abdominal ultrasound was performed.    Comparison: None.    Clinical History: RUQ pain, positive adams sign.    Findings:  Liver: Unremarkable with no intrahepatic duct dilatation. The liver measures 14.9 centimeters in greatest dimension and demonstrates slight increased echogenicity which may reflect fatty infiltration. Correlate with clinical and laboratory findings.  Mass: No discrete mass is seen.  Cyst: No definitive cyst is seen.  Biliary ducts: The common bile duct is within normal limits at 5.2 mm in diameter.  Gallbladder: There is a single gallstone in the gallbladder neck measuring 0.9 cm. The gallbladder wall measures 1.8 mm in thickness. There is no pericholecystic fluid. The sonographic [place] sign is positive. This could represent acute cholecystitis.  Pancreas: No mass, pancreatitis, or ductal dilatation.  Kidneys: No stones hydronephrosis or mass.  Right kidney:  Dimensions: The right kidney measures 10 sagittal by 4.8 by 4.8 in dimension and appears unremarkable.  Vascular:  IVC: The IVC is within normal limits.      Impression:  1. There is a single gallstone in the gallbladder neck measuring 0.9 cm. The sonographic Milaca sign is positive. This could represent acute cholecystitis.                                         X-Ray Chest 1 View (In process)                      Medications   sodium chloride 0.9% bolus 1,000 mL 1,000 mL ( Intravenous Stopped 11/15/23 0023)   ondansetron injection 4 mg (4 mg Intravenous Given 11/14/23 2313)   ketorolac injection 15 mg (15 mg Intravenous Given 11/14/23 2313)   dicyclomine injection 20 mg (20 mg Intramuscular Given 11/15/23 0014)      Medical Decision Making  Amount and/or Complexity of Data Reviewed  Labs: ordered.  Radiology: ordered.  Discussion of management or test interpretation with external provider(s): Switch General surgery on-call  - surgery team will come out and see the patient and determine if she needs to be admitted for surgical removal    Risk  Prescription drug management.               ED Course as of 11/15/23 0152   Wed Nov 15, 2023   0152 Transitioned to Dr. Shabazz pending General surgery seen patient and coming up with a plan [AL]      ED Course User Index  [AL] Chito Umanzor PA                    Clinical Impression:   Final diagnoses:  [R10.13] Epigastric abdominal pain  [R05.9] Cough, unspecified type (Primary)  [K80.00] Calculus of gallbladder with acute cholecystitis without obstruction               Chito Umanzor PA  11/15/23 0152

## 2023-11-15 NOTE — MEDICAL/APP STUDENT
"Acute Care Surgery   Progress Note  Admit Date: 11/14/2023  HD#0  POD#* No surgery found *    Subjective:   Interval history:    -presented to ED overnight on 11/14 with epigastric pain, now stable on floor  -denies SOB   -pain well controlled   -NPO, last meal was at 1800 on 11/14  -having BM, Flatus  -voiding without issue     Continuous Infusions:   lactated ringers 100 mL/hr at 11/15/23 0316       Objective:     VITAL SIGNS: 24 HR MIN & MAX LAST   Temp  Min: 97.9 °F (36.6 °C)  Max: 98.5 °F (36.9 °C)  97.9 °F (36.6 °C)   BP  Min: 112/62  Max: 134/70  112/62    Pulse  Min: 71  Max: 99  71    Resp  Min: 14  Max: 19  17    SpO2  Min: 98 %  Max: 100 %  100 %      HT: 5' 6" (167.6 cm)  WT: 70 kg (154 lb 5.2 oz)  BMI: 24.9     Intake/output:  Intake/Output - Last 3 Shifts         11/13 0700 11/14 0659 11/14 0700  11/15 0659    IV Piggyback  1002.1    Total Intake(mL/kg)  1002.1 (14.3)    Net  +1002.1                  Intake/Output Summary (Last 24 hours) at 11/15/2023 0448  Last data filed at 11/15/2023 0027  Gross per 24 hour   Intake 1002.06 ml   Output --   Net 1002.06 ml           Lines/drains/airway:       Peripheral IV - Single Lumen 11/14/23 2258 18 G Left Antecubital (Active)   Site Assessment Clean;Dry;Intact;No redness;No swelling 11/15/23 0254   Extremity Assessment Distal to IV No warmth;No redness;No swelling;No abnormal discoloration 11/15/23 0254   Line Status Infusing 11/15/23 0254   Dressing Status Clean;Dry;Intact 11/15/23 0254   Dressing Intervention Integrity maintained 11/15/23 0254   Number of days: 0       Physical examination:  Gen: NAD, answering questions appropriately  CV: RRR, +2 radial pulses  Resp: NWOB  Abd: Soft, nondistended. Mild epigastric tenderness without guarding or rigidity.    Labs:  Renal:  Recent Labs     11/14/23  2256 11/15/23  0404   BUN 7.1 7.4   CREATININE 0.64 0.66     No results for input(s): "LACTIC" in the last 72 hours.  LISA:  Recent Labs     11/14/23  6835 " "11/15/23  0404    141   K 3.2* 4.1   CO2 23 23   CALCIUM 9.4 9.1   ALBUMIN 3.4* 3.2*   BILITOT 0.5 0.8   AST 13 12   ALKPHOS 114 106   ALT 18 16     Heme:  Recent Labs     11/14/23  2255 11/14/23  2256 11/15/23  0404   HGB 12.8  --  11.9*   HCT 40.3  --  38.2     --  176   INR  --  1.0  --      ID:  Recent Labs     11/14/23  2255 11/15/23  0404   WBC 12.35* 10.72     CBG:  Recent Labs     11/14/23  2256 11/15/23  0404   GLUCOSE 147* 121*      Cardiovascular:  Recent Labs   Lab 11/14/23 2255   TROPONINI <0.010     ABG:  No results for input(s): "PH", "PO2", "PCO2", "HCO3", "BE" in the last 168 hours.   I have reviewed all pertinent lab results within the past 24 hours.    Imaging:  US Abdomen Limited         X-Ray Chest 1 View   ED Interpretation   Normal cardiomediastinal silhouette. No dense lobar consolidation or pneumothorax.         I have reviewed all pertinent imaging results/findings within the past 24 hours.    Micro/Path/Other:  Microbiology Results (last 7 days)       Procedure Component Value Units Date/Time    Blood Culture [[id number]] Collected: 11/15/23 0424    Order Status: Sent Specimen: Blood from Arm, Right     Blood Culture [[id number]] Collected: 11/15/23 0404    Order Status: Sent Specimen: Blood from Arm, Right Updated: 11/15/23 0410           Specimen (168h ago, onward)      None             Assessment & Plan:   Apoorva Garcia is a 37yo F with a PMH of H.pylori and hyperthyroidism presenting to [facility] ED with epigastric/RUQ pain for one day, with concern for early acute cholecystitis. Abdominal U/S noted single 0.9cm stone in the neck of gallbladder, with a gallbladder wall thickness of 1.8mm, and positive sonographic Dunham's sign.     -plan for lap marj on 11/16 pending OR availability  -NPO  -mIVF  -IV Zosyn  -PPI  -multimodal pain control    Lawrence Castillo, MS3  "

## 2023-11-15 NOTE — PROGRESS NOTES
11/15/23 0900   Missed Time Reason   OT Attempted Eval Date 11/15/23   OT Attempted Eval Time 0850   Missed Treatment Reason Patient unwilling to participate     Pt expressed that she would be willing to participate after sx and per nurse will be going to sx in next hour . Will complete OT eval as appropriate and advised post op

## 2023-11-15 NOTE — PT/OT/SLP PROGRESS
Physical Therapy    Missed Treatment Session    Patient Name:  Apoorva Garcia   MRN:  [id number]      Patient not seen at this time secondary to pt. Still being downstairs in surgery.    Will follow-up as patient is available to participate and as therapists' schedule allows.

## 2023-11-15 NOTE — ANESTHESIA POSTPROCEDURE EVALUATION
Anesthesia Post Evaluation    Patient: Apoorva Jacobson Garcia    Procedure(s) Performed: Procedure(s) (LRB):  CHOLECYSTECTOMY, LAPAROSCOPIC (N/A)    Final Anesthesia Type: general      Patient location during evaluation: med/surg floor  Post-procedure vital signs: reviewed and stable  Airway patency: patent      Anesthetic complications: no      Cardiovascular status: hemodynamically stable  Respiratory status: spontaneous ventilation  Follow-up not needed.          Vitals Value Taken Time   /91 11/15/23 1504   Temp 36.2 °C (97.2 °F) 11/15/23 1425   Pulse 92 11/15/23 1500   Resp 22 11/15/23 1500   SpO2 99 % 11/15/23 1500         No case tracking events are documented in the log.      Pain/Tahmina Score: Pain Rating Prior to Med Admin: 9 (11/15/2023  2:51 PM)  Pain Rating Post Med Admin: 9 (11/14/2023 11:56 PM)  Tahmina Score: 10 (11/15/2023  3:04 PM)

## 2023-11-15 NOTE — PLAN OF CARE
11/15/23 1055   Discharge Assessment   Assessment Type Discharge Planning Assessment   Confirmed/corrected address, phone number and insurance Yes   Confirmed Demographics Correct on Facesheet   Source of Information patient   When was your last doctors appointment?   (No PCP)   Reason For Admission Epigastric pain, acute cholecystitis   People in Home alone   Do you expect to return to your current living situation? Yes   Do you have help at home or someone to help you manage your care at home? No   Prior to hospitilization cognitive status: Alert/Oriented   Current cognitive status: Alert/Oriented   Equipment Currently Used at Home none   Readmission within 30 days? No   Patient currently being followed by outpatient case management? No   Do you currently have service(s) that help you manage your care at home? No   Do you take prescription medications? No   Do you have prescription coverage? Yes   Coverage [facility] - [facility]   Do you have any problems affording any of your prescribed medications? No   Who is going to help you get home at discharge? Family   How do you get to doctors appointments? family or friend will provide   Are you on dialysis? No   Do you take coumadin? No   DME Needed Upon Discharge  none   Discharge Plan discussed with: Patient   Discharge Plan A Home   Physical Activity   On average, how many days per week do you engage in moderate to strenuous exercise (like a brisk walk)? 0 days   On average, how many minutes do you engage in exercise at this level? 0 min   Financial Resource Strain   How hard is it for you to pay for the very basics like food, housing, medical care, and heating? Not hard   Housing Stability   In the last 12 months, was there a time when you were not able to pay the mortgage or rent on time? N   In the last 12 months, was there a time when you did not have a steady place to sleep or slept in a shelter (including now)? N   Transportation Needs   In  the past 12 months, has lack of transportation kept you from medical appointments or from getting medications? no   In the past 12 months, has lack of transportation kept you from meetings, work, or from getting things needed for daily living? No   Food Insecurity   Within the past 12 months, you worried that your food would run out before you got the money to buy more. Never true   Within the past 12 months, the food you bought just didn't last and you didn't have money to get more. Never true   Stress   Do you feel stress - tense, restless, nervous, or anxious, or unable to sleep at night because your mind is troubled all the time - these days? Not at all   Social Connections   In a typical week, how many times do you talk on the phone with family, friends, or neighbors? Twice a week   How often do you get together with friends or relatives? Twice   How often do you attend Episcopal or Taoist services? Never   Do you belong to any clubs or organizations such as Episcopal groups, unions, fraternal or athletic groups, or school groups? No   How often do you attend meetings of the clubs or organizations you belong to? Never   Are you , , , , never , or living with a partner?    Alcohol Use   Q1: How often do you have a drink containing alcohol? Never   Q2: How many drinks containing alcohol do you have on a typical day when you are drinking? None   Q3: How often do you have six or more drinks on one occasion? Never

## 2023-11-15 NOTE — OP NOTE
CHOLECYSTECTOMY, LAPAROSCOPIC Procedure Note  Apoorva Garcia  MRN:  [id number]  : 1985  Procedure Date: 11/15/2023    Procedure: Laparoscopic cholecystectomy    Pre-op Dx: Acute cholecystitis    Post-op Dx: As above     Staff: Dr. Barfield  Resident Surgeon:   Gallo Fischer MD Resident PGY5  Bandar Fischer MD Resident PGY3  Gloria Edwards MD Resident PGY3    Anesthesia: GETA    Indication for Procedure: Apoorva Garcia is a 37 yo F who presented with a 1 day history of epigastric abdominal pain. Workup was significant for leukocytosis and RUQ US with findings concerning for cholecystitis.        Procedure Details   Risks, benefits, and alternatives were discussed with the patient and written consent was obtained for a laparoscopic cholecystectomy.  Patient was given preoperative IV antibiotics,  and heparin. SCDs were placed to bilateral lower extremities. Patient was then taken to the operating room and placed supine on the operating table. General anesthesia was induced and the patient's abdomen was prepped and draped in standard sterile surgical fashion.      Access to the intra-abdominal cavity was gained with a Veress needle at Gutierrez's point. After water drop text confirmed intraabdominal position, abdomen was insufflated. Good flow was not able to be achieved and subsequently a supraumbilical 5 mm incision was made into which a 5 mm optical trocar and camera were inserted.  Access was gained under direct visualization. The abdominal cavity was visually explored.  There was no evidence of bowel or other viscus injury noted from insertion of the first trocar or Veress needle.  No additional pathology was noted. Two additional 5 mm trocars were then placed in the right upper quadrant under direct visualization.  A 12 mm subxiphoid trocar was also placed under direct visualization.      Attention was turned to the right upper quadrant and the gallbladder fundus.  The gallbladder fundus was grasped  and held in a superior and lateral direction. A small amount of inflammation was noted.  The infundibulum was identified, dissected free, grasped and held out laterally so the critical view could be achieved.  Next the cystic duct and cystic artery were then identified, dissected free, triply clipped and ligated once the critical view had been established and confirmed.  Once this was accomplished, cautious use of hook electrocautery was then used to remove the gallbladder from its hepatic fossa.  The gallbladder was removed via the 12 mm subxiphoid port site in an Endocatch bag and sent to pathology as a specimen. The surgical site was inspected. Hemostasis was achieved and confirmed per visual inspection.  There was no evidence of bowel injury or perforation. 12 mm trocar site was closed with a 0 Vicryl suture using a PMI.  The pneumoperitoneum was released. Vicryl suture was tied after abdomen was desufflated.    All ports were removed. Incisions were anesthetized. The incisions were closed with Monocryl suture in interrupted fashion. Sterile dressings were applied. All counts performed were correct at the end of the case.  Patient tolerated procedure well and without complication. They returned to recovery room awake, alert, and in good condition.    Findings: Mild inflammatory changes of the gallbladder    Estimated Blood Loss:  10 mL      Drains: none           Specimens: Gallbladder           Implants: None     Complications:  None           Disposition: PACU - hemodynamically stable.           Condition: stable    Tessa Barfield MD  Phone Number: [phone]      Bandar Fischer MD  [employer] General Surgery, PGY-3

## 2023-11-16 VITALS
BODY MASS INDEX: 24.8 KG/M2 | RESPIRATION RATE: 17 BRPM | HEIGHT: 66 IN | SYSTOLIC BLOOD PRESSURE: 100 MMHG | OXYGEN SATURATION: 100 % | TEMPERATURE: 98 F | WEIGHT: 154.31 LBS | DIASTOLIC BLOOD PRESSURE: 61 MMHG | HEART RATE: 75 BPM

## 2023-11-16 PROBLEM — K80.00 CALCULUS OF GALLBLADDER WITH ACUTE CHOLECYSTITIS WITHOUT OBSTRUCTION: Status: ACTIVE | Noted: 2023-11-16

## 2023-11-16 PROCEDURE — 97165 OT EVAL LOW COMPLEX 30 MIN: CPT

## 2023-11-16 PROCEDURE — 94761 N-INVAS EAR/PLS OXIMETRY MLT: CPT

## 2023-11-16 PROCEDURE — 25000003 PHARM REV CODE 250: Performed by: STUDENT IN AN ORGANIZED HEALTH CARE EDUCATION/TRAINING PROGRAM

## 2023-11-16 PROCEDURE — 63600175 PHARM REV CODE 636 W HCPCS: Performed by: STUDENT IN AN ORGANIZED HEALTH CARE EDUCATION/TRAINING PROGRAM

## 2023-11-16 PROCEDURE — G0378 HOSPITAL OBSERVATION PER HR: HCPCS

## 2023-11-16 PROCEDURE — C9113 INJ PANTOPRAZOLE SODIUM, VIA: HCPCS | Performed by: STUDENT IN AN ORGANIZED HEALTH CARE EDUCATION/TRAINING PROGRAM

## 2023-11-16 PROCEDURE — 96361 HYDRATE IV INFUSION ADD-ON: CPT

## 2023-11-16 PROCEDURE — 97162 PT EVAL MOD COMPLEX 30 MIN: CPT

## 2023-11-16 PROCEDURE — 99900035 HC TECH TIME PER 15 MIN (STAT)

## 2023-11-16 PROCEDURE — 96376 TX/PRO/DX INJ SAME DRUG ADON: CPT

## 2023-11-16 RX ORDER — HYDROCODONE BITARTRATE AND ACETAMINOPHEN 5; 325 MG/1; MG/1
1 TABLET ORAL EVERY 6 HOURS PRN
Qty: 18 TABLET | Refills: 0 | Status: SHIPPED | OUTPATIENT
Start: 2023-11-16 | End: 2023-11-21 | Stop reason: SDUPTHER

## 2023-11-16 RX ADMIN — TRAMADOL HYDROCHLORIDE 50 MG: 50 TABLET, COATED ORAL at 09:11

## 2023-11-16 RX ADMIN — METHIMAZOLE 5 MG: 5 TABLET ORAL at 09:11

## 2023-11-16 RX ADMIN — PANTOPRAZOLE SODIUM 40 MG: 40 INJECTION, POWDER, FOR SOLUTION INTRAVENOUS at 09:11

## 2023-11-16 RX ADMIN — ACETAMINOPHEN 650 MG: 325 TABLET, FILM COATED ORAL at 05:11

## 2023-11-16 RX ADMIN — PROPRANOLOL HYDROCHLORIDE 60 MG: 20 TABLET ORAL at 09:11

## 2023-11-16 RX ADMIN — ACETAMINOPHEN 650 MG: 325 TABLET, FILM COATED ORAL at 02:11

## 2023-11-16 RX ADMIN — ACETAMINOPHEN 650 MG: 325 TABLET, FILM COATED ORAL at 09:11

## 2023-11-16 RX ADMIN — OXYBUTYNIN CHLORIDE 10 MG: 5 TABLET, EXTENDED RELEASE ORAL at 09:11

## 2023-11-16 NOTE — MEDICAL/APP STUDENT
" Acute Care Surgery   Progress Note  Admit Date: 11/14/2023  HD#0  POD#1 Day Post-Op    Subjective:   Interval history:    -NAEON, denies SOB  -pain well controlled  -tolerating oral diet without N/V  -Has BM, Flatus  -voiding without issue    Continuous Infusions:    Objective:     VITAL SIGNS: 24 HR MIN & MAX LAST   Temp  Min: 97.2 °F (36.2 °C)  Max: 98.3 °F (36.8 °C)  97.5 °F (36.4 °C)   BP  Min: 100/55  Max: 141/91  117/65    Pulse  Min: 48  Max: 92  (!) 48    Resp  Min: 16  Max: 24  19    SpO2  Min: 97 %  Max: 100 %  98 %      HT: 5' 6" (167.6 cm)  WT: 70 kg (154 lb 5.2 oz)  BMI: 24.9     Intake/output:  Intake/Output - Last 3 Shifts         11/14 0700  11/15 0659 11/15 0700 11/16 0659    I.V. (mL/kg)  0.5 (0)    IV Piggyback 1002.1     Total Intake(mL/kg) 1002.1 (14.3) 0.5 (0)    Net +1002.1 +0.5                  Intake/Output Summary (Last 24 hours) at 11/16/2023 0448  Last data filed at 11/15/2023 1455  Gross per 24 hour   Intake 0.5 ml   Output --   Net 0.5 ml           Lines/drains/airway:       Peripheral IV - Single Lumen 11/14/23 2258 18 G Left Antecubital (Active)   Site Assessment Clean;Dry;Intact 11/16/23 0200   Extremity Assessment Distal to IV No abnormal discoloration 11/15/23 2000   Line Status Infusing 11/16/23 0200   Dressing Status Clean;Dry;Intact 11/16/23 0200   Dressing Intervention Integrity maintained 11/16/23 0200   Reason Not Rotated Not due 11/15/23 1425   Number of days: 1       Physical examination:  Gen: NAD, answering questions appropriately  CV: RRR, +2 radial pulses  Resp: NWOB  Abd: Intact, well approximated sutures in place over abdominal incision sites without surrounding erythema, edema, or drainage    Labs:  Renal:  Recent Labs     11/14/23 2256 11/15/23  0404   BUN 7.1 7.4   CREATININE 0.64 0.66     No results for input(s): "LACTIC" in the last 72 hours.  LISA:  Recent Labs     11/14/23  2256 11/15/23  0404    141   K 3.2* 4.1   CO2 23 23   CALCIUM 9.4 9.1 " "  ALBUMIN 3.4* 3.2*   BILITOT 0.5 0.8   AST 13 12   ALKPHOS 114 106   ALT 18 16     Heme:  Recent Labs     11/14/23  2255 11/14/23  2256 11/15/23  0404   HGB 12.8  --  11.9*   HCT 40.3  --  38.2     --  176   INR  --  1.0  --      ID:  Recent Labs     11/14/23  2255 11/15/23  0404   WBC 12.35* 10.72     CBG:  Recent Labs     11/14/23  2256 11/15/23  0404   GLUCOSE 147* 121*      Cardiovascular:  Recent Labs   Lab 11/14/23 2255   TROPONINI <0.010     ABG:  No results for input(s): "PH", "PO2", "PCO2", "HCO3", "BE" in the last 168 hours.   I have reviewed all pertinent lab results within the past 24 hours.    Imaging:  US Abdomen Limited   Final Result   Impression:      1. There is a single gallstone in the gallbladder neck measuring 0.9 cm. The sonographic [place] sign is positive.  Please correlate clinically.  This could represent acute cholecystitis.      No significant discrepancy with overnight report.         Electronically signed by: Melchor Pandey   Date:    11/15/2023   Time:    06:27      X-Ray Chest 1 View   ED Interpretation   Normal cardiomediastinal silhouette. No dense lobar consolidation or pneumothorax.      Final Result      No acute findings.         Electronically signed by: Jimmy Farley   Date:    11/15/2023   Time:    10:13         I have reviewed all pertinent imaging results/findings within the past 24 hours.    Micro/Path/Other:  Microbiology Results (last 7 days)       Procedure Component Value Units Date/Time    Blood Culture [[id number]] Collected: 11/15/23 0424    Order Status: Resulted Specimen: Blood from Arm, Right Updated: 11/15/23 0459    Blood Culture [[id number]] Collected: 11/15/23 0404    Order Status: Resulted Specimen: Blood from Arm, Right Updated: 11/15/23 0410           Specimen (168h ago, onward)       Start     Ordered    11/15/23 1404  Specimen to Pathology  RELEASE UPON ORDERING        References:    Click here for ordering Quick Tip   Question:  Release to " patient  Answer:  Immediate    11/15/23 5194                     Assessment & Plan:   Apoorva Garcia is a 37yo F with a PMH of H.pylori and hyperthyroidism who presented to the [facility] ED on 11/15 with epigastric/RUQ pain for one day, with concern for early acute cholecystitis. Abdominal U/S noted single 0.9cm stone in the neck of gallbladder, with a gallbladder wall thickness of 1.8mm, and positive sonographic Dunham's sign. Patient now s/p laparoscopic cholecystectomy on 11/15.    -patient now stable for discharge   -provided with ED precautions  -MM pain control  -ambulate  -enoxaparin    Lawrence Castillo, MS3

## 2023-11-16 NOTE — PT/OT/SLP EVAL
Physical Therapy Evaluation    Patient Name:  Apoorva Garcia   MRN:  [id number]    Recommendations:     Therapy Intensity Recommendations at Discharge: No Therapy Indicated  Discharge Equipment Recommendations: none   Equipment to be obtained for discharge: none.  Barriers to discharge: decreased endurance    Assessment:     Apoorva Garcia is a 38 y.o. female admitted with a medical diagnosis of Calculus of gallbladder with acute cholecystitis without obstruction.  1. Cough, unspecified type    2. Epigastric abdominal pain    3. Calculus of gallbladder with acute cholecystitis without obstruction       Patient Active Problem List   Diagnosis    Calculus of gallbladder with acute cholecystitis without obstruction      She presents with the following impairments/functional limitations:  impaired functional mobility, impaired endurance, pain, impaired balance, gait instability.    Rehab Prognosis: Good.    Patient would benefit from continued skilled acute PT services to: address above listed impairments/functional limitations; receive patient/caregiver education; reduce fall risk; and maximize independency/safety with functional mobility.    -continued: up-to-chair, ambulation, with progression of gait distance/frequency/duration and speed, as tolerated/appropriate, with assistance and supervision     -will see patient 1-2 additional treatment sessions (until STG's met) to increase endurance and increase bilat UE arm swing    Recent Surgery: Procedure(s) (LRB):  CHOLECYSTECTOMY, LAPAROSCOPIC (N/A) 1 Day Post-Op    Plan:     During this hospitalization, patient to be seen 3 x/week to address the identified impairments/functional limitations via therapeutic activities, therapeutic exercises, gait training and progress toward the established goals.    Plan of Care Expires:  12/14/23    Subjective     Communicated with patient's nurse Chichi prior to session.    Patient agreeable to participate in evaluation.      Chief Complaint: pain Rt side/shoulder  Patient/Family Comments/goals: return home to pre-admit PLOF   Pain/Comfort:  Pain Rating 1: 7/10  Location 1:  (Rt lateral abdomen and Rt shoulder)  Pain Addressed 1: Nurse notified  Pain Rating Post-Intervention 1: 7/10    Patients cultural, spiritual, Scientology conflicts given the current situation: no    Social History  Living Environment: Patient lives with their spouse, daughter, and son in a single level home, with 3 steps steps outside, with bilat handrails, with walk-in shower.  Functional Level: Prior to admission patient was employed, was driving, ambulated without assistive device, was independent in ADL's, and was independent in IADL's.  Equipment Used at Home: none  Equipment owned (not currently used): none.  Assistance Upon Discharge: spouse and family.    Hand dominance: right    Patient denied lightheadedness/dizziness.  Patient denied current swallowing difficulty.    Objective:     Additional - PT 'post-op' order received - 0832-11/16/2023    PHANI Valladares in room for evaluation    Patient found supine in bed, with HOB elevated, and bed rails up bilateral HOB and left FOB with peripheral IV  upon PT entry to room.    General Precautions: Standard,  (post-op)   Orthopedic Precautions:N/A   Braces: N/A  Respiratory Status: room air    Vitals   At Rest (pre-session)  BP  123/71   HR  83   O2 Sat %  97      With Activity (post-session)  BP  121/77   HR  80   O2 Sat %  98     Exams:  Orientation: Patient is oriented to person, place, time, situation  Commands: Patient follows multi-step verbal commands  Fine Motor Coordination:     -     Intact: Rapid alternating ankle DF/PF  Sensation:    -     Intact: light/touch bilat lower extremity  RLE ROM: WFL  RLE Strength: WFL  LLE ROM: WFL  LLE Strength: WFL    Functional Mobility:    Bed Mobility:  Rolling Right: independence  Scooting: modified independence  Supine to Sit: modified independence  with no cues  required    Transfers:  Sit to Stand: modified independence with no assistive device  with no cues required    Gait:  Patient ambulated 260ft w/ steps negotiation no assistive device and modified independence.  Patient demonstrates  guarded/slowed gait pattern, w/ mis-step x1 (the toe of patients tennis shoe caught on floor surface) w/ patient self correcting (no hand support from patient or therapist required), steady gait, symmetrical step length, upright posture, decreased foot/floor clearance, and decreased arm swing.    Other Mobility:  Steps: Patient ascended/descended 4 steps with no device with bilat handrails with stand by assistance  Slowed/guarded mvmts    Balance:  Sit  Patient demonstrated static balance on level surface with independence with no verbal cues.  Patient demonstrated dynamic balance on level surface with modified independence with no verbal cues during moderate excursions.  Stand  Patient demonstrated static balance on level surface  using no device with independence with no verbal cues.    Additional Treatment Session  n/a    Patient left sitting in chair with peripheral IV with all lines intact, call button in reach, tray table at bedside, and patient' nurse notified.    Education     Patient was instructed to utilize staff assistance for mobility/transfers.  White board updated regarding patient's safest level of mobility with staff assistance.    Goals     Multidisciplinary Problems       Physical Therapy Goals       Goals to be met by: DISCHARGE     Patient will increase functional independence with mobility by performing:    -. Supine to sit with [place]  -. Sit to supine with [place]  -. Sit to stand transfer with [place]  -. Gait  x 260 feet x2 w/ sitting rest x3 minutes b/w sessions with Modified [place] using No Assistive Device.   -. Ascend/descend 4 stair with bilateral Handrails Modified [place] using No Assistive Device  ESTABLISHED 11/16/2023         History:   History reviewed. No pertinent past medical history.  Past Surgical History:   Procedure Laterality Date    LAPAROSCOPIC CHOLECYSTECTOMY N/A 11/15/2023    Procedure: CHOLECYSTECTOMY, LAPAROSCOPIC;  Surgeon: Tessa Barfield MD;  Location: [facility];  Service: General;  Laterality: N/A;     Time Tracking:     PT Received On: 11/16/23  PT Start Time: 0849     PT Stop Time: 0912  PT Total Time (min): 23 min     Billable Minutes: Evaluation 23  Non-Billable Minutes: n/a  11/16/2023

## 2023-11-16 NOTE — DISCHARGE SUMMARY
[facility] - [address] [place] [address] Telemetry  Discharge Summary    Admit Date: 11/14/2023    Discharge Date and Time:  11/16/2023 6:03 AM    Attending Physician: Austin Ulrich Jr.,*     Reason for Admission: Acute Cholecystitis    Procedures Performed: Procedure(s) (LRB):  CHOLECYSTECTOMY, LAPAROSCOPIC (N/A)    Hospital Course: Apoorva Garcia is a 38 y.o. female with PMH of hyperthyroidism and H. Pylori infection who presented on 11/15 to [facility] ED with a one day hx of acute onset RUQ abdominal pain and leukocytosis. RUQ U/S performed at the time of presentation noted single gallstone in gallbladder neck at 0.9 cm and positive sonographic Dunham's sign. Patient was started on Zosyn and mIVF and scheduled for laparoscopic cholecystectomy on 11/15, which was tolerated without intraoperative complication. Her diet was advanced which she tolerated well. Her pain was well controlled. Patient stable for discharge on 11/16 with follow up in [facility] surgery clinic in two weeks.    Goals of Care Treatment Preferences:  Code Status: Full Code    Consults: general surgery    Significant Diagnostic Studies: Radiology: Ultrasound:  Clinical History:RUQ pain, positive dunham sign.     Findings:  Liver:Unremarkable with no intrahepatic duct dilatation. The liver measures 14.9 centimeters in greatest dimension and demonstrates slight increased echogenicity which may reflect fatty infiltration. Correlate with clinical and laboratory findings.  Biliary ducts:The common bile duct is within normal limits at 5.2 mm in diameter.  Gallbladder:There is a single gallstone in the gallbladder neck measuring 0.9 cm. The gallbladder wall measures 1.8 mm in thickness. There is no pericholecystic fluid. The sonographic [place] sign is positive. This could represent acute cholecystitis.  Pancreas:No mass, pancreatitis, or ductal dilatation.  Kidneys:No stones hydronephrosis or mass.  Right kidney:  Dimensions:The right kidney measures 10  sagittal by 4.8 by 4.8 in dimension and appears unremarkable.  Vascular:  IVC:The IVC is within normal limits.  Impression  1. There is a single gallstone in the gallbladder neck measuring 0.9 cm. The sonographic [place] sign is positive.  Please correlate clinically.  This could represent acute cholecystitis.    Final Diagnoses:    Principal Problem: Acute Cholecystits   Secondary Diagnoses:   Active Hospital Problems    Diagnosis  POA    *Calculus of gallbladder with acute cholecystitis without obstruction [K80.00]  Unknown      Resolved Hospital Problems   No resolved problems to display.     Discharged Condition: good    Disposition: Home or Self Care    Follow Up/Patient Instructions: Provided with ED precautions. Return to surgery clinic in 2 weeks for post operative follow up.     Medications:  Reconciled Home Medications:      Medication List        START taking these medications      HYDROcodone-acetaminophen 5-325 mg per tablet  Commonly known as: NORCO  Take 1 tablet by mouth every 6 (six) hours as needed for Pain.            CONTINUE taking these medications      albuterol 90 mcg/actuation inhaler  Commonly known as: PROVENTIL/VENTOLIN HFA  Inhale 2 puffs into the lungs every 4 (four) hours as needed.     benzonatate 100 MG capsule  Commonly known as: TESSALON  Take 100 mg by mouth 3 (three) times daily as needed for Cough.     methIMAzole 10 MG Tab  Commonly known as: TAPAZOLE  Take 5 mg by mouth 3 (three) times daily.     oxybutynin 10 MG 24 hr tablet  Commonly known as: DITROPAN-XL  Take 10 mg by mouth 2 (two) times a day.     propranoloL 120 mg Cp24  Take by mouth.            Discharge Procedure Orders   Lifting restrictions   Order Comments: No lifting heavier than 15 lbs for 2 weeks (until follow up appointment).     Notify your health care provider if you experience any of the following:  temperature >100.4     Notify your health care provider if you experience any of the following:  persistent  nausea and vomiting or diarrhea     Notify your health care provider if you experience any of the following:  severe uncontrolled pain     Notify your health care provider if you experience any of the following:  persistent dizziness, light-headedness, or visual disturbances     No dressing needed   Order Comments: Ok to shower, keep incisions clean and pat dry after shower. No bath or submerging in water for two weeks after surgery. Skin glue is in place covering incisions, this can be peeled off after 1-2 weeks.       Note written with assistance from Lawrence Castillo MS3 and edited as needed above.     Bandar Fischer MD  [facility] General Surgery, PGY-3

## 2023-11-16 NOTE — PLAN OF CARE
Problem: Adult Inpatient Plan of Care  Goal: Plan of Care Review  Outcome: Adequate for Care Transition  Goal: Patient-Specific Goal (Individualized)  Outcome: Adequate for Care Transition  Goal: Absence of Hospital-Acquired Illness or Injury  Outcome: Adequate for Care Transition  Goal: Optimal Comfort and Wellbeing  Outcome: Adequate for Care Transition  Goal: Readiness for Transition of Care  Outcome: Adequate for Care Transition     Problem: Pain Acute  Goal: Acceptable Pain Control and Functional Ability  Outcome: Adequate for Care Transition     Problem: Bleeding (Cholecystectomy)  Goal: Absence of Bleeding  Outcome: Adequate for Care Transition     Problem: Fluid and Electrolyte Imbalance (Cholecystectomy)  Goal: Fluid and Electrolyte Balance  Outcome: Adequate for Care Transition     Problem: Ongoing Anesthesia Effects (Cholecystectomy)  Goal: Anesthesia/Sedation Recovery  Outcome: Adequate for Care Transition     Problem: Pain (Cholecystectomy)  Goal: Acceptable Pain Control  Outcome: Adequate for Care Transition

## 2023-11-16 NOTE — PT/OT/SLP EVAL
Occupational Therapy   Evaluation and Discharge Note    Name: Apoorva Garcia  MRN: [id number]  Admitting Diagnosis: Calculus of gallbladder with acute cholecystitis without obstruction  Recent Surgery: Procedure(s) (LRB):  CHOLECYSTECTOMY, LAPAROSCOPIC (N/A) 1 Day Post-Op    Recommendations:     Discharge Recommendations: No Therapy Indicated  Discharge Equipment Recommendations: none  Barriers to discharge:  None    Assessment:     Apoorva Garcia is a 38 y.o. female with a medical diagnosis of Calculus of gallbladder with acute cholecystitis without obstruction. At this time, patient is functioning at their prior level of function and does not require further acute OT services.     Plan:     During this hospitalization, patient does not require further acute OT services.  Please re-consult if situation changes.    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  Patient found HOB elevated with  peripheral IV upon OT entry to room.    General Precautions: Standard,    Orthopedic Precautions: N/A  Braces: N/A  Respiratory Status: Room air     VITALS  Presession  Post session  /71   121/77  HR 82   80  O2 98%   98%    Occupational Performance:    Bed Mobility:    Patient completed Supine to Sit with mod  independence impacted by pain    Functional Mobility/Transfers:  Patient completed Sit <> Stand Transfer with independence and modified independence  with  no assistive device   Patient completed Bed <> Chair Transfer using Step Transfer technique with modified independence with no assistive device and impacted by pain  Patient completed Toilet Transfer Step Transfer technique with modified independence with  grab bars and impacted by pain  Functional Mobility: Pt ambulated in room for self care tasks mod I impacted by pain ; guarded mobility , no LOB and with PT in hallway 260 feet with no break and mod independent. Stairs with PT 4 up /4 down with SBA and B handrail    Activities of Daily Living:  Grooming: independence standing at sink  Upper Body Dressing: modified independence impacted by pain right shoulder  Lower Body Dressing: modified independence impacted by pain  Toileting: modified independence impacted by pain    Cognitive/Visual Perceptual:  Cognitive/Psychosocial Skills:     -       Oriented to: Person, Place, Time, and Situation   -       Follows Commands/attention:Follows multistep  commands  -       Safety awareness/insight to disability: intact     Physical Exam:  Balance: -       sit independent , standing independent for basic self care tasks   Dominant hand: -       right   Upper Extremity Range of Motion:  -       Right Upper Extremity: WNL  -       Left Upper Extremity: WNL  Upper Extremity Strength:    -       Right Upper Extremity: WNL  -       Left Upper Extremity: WNL   Strength:    -       Right Upper Extremity: WNL  -       Left Upper Extremity: WNL  Fine Motor  Coordination:    -       Intact  Left hand, finger to nose, Right hand, finger to nose, Left hand thumb/finger opposition skills, and Right hand thumb/finger opposition skills    Treatment & Education:  Pt educated no further OT services needed at this time.    Patient left up in chair with all lines intact, call button in reach, and nurse  notified    GOALS:   Multidisciplinary Problems       Occupational Therapy Goals       Not on file                    History:     History reviewed. No pertinent past medical history.      Past Surgical History:   Procedure Laterality Date    LAPAROSCOPIC CHOLECYSTECTOMY N/A 11/15/2023    Procedure: CHOLECYSTECTOMY, LAPAROSCOPIC;  Surgeon: Tessa Barfield MD;  Location: [facility];  Service: General;  Laterality: N/A;       Time Tracking:     OT Date of Treatment: 11/16/23  OT Start Time: 0848  OT Stop Time: 0910  OT Total Time (min): 22 min    Billable Minutes:Evaluation 22 min     11/16/2023

## 2023-11-16 NOTE — PROGRESS NOTES
" Acute Care Surgery   Progress Note    Admit Date: 11/14/2023  HD#0  POD#1 Day Post-Op    Subjective:   Interval history:    -NAEON, denies SOB  -Pain well controlled  -Tolerating oral diet without N/V  -Has BM, Flatus  -Voiding without issue  -Ambulating unassisted    Continuous Infusions:    Objective:     VITAL SIGNS: 24 HR MIN & MAX LAST   Temp  Min: 97.2 °F (36.2 °C)  Max: 98.3 °F (36.8 °C)  97.5 °F (36.4 °C)   BP  Min: 100/55  Max: 141/91  117/65    Pulse  Min: 48  Max: 92  (!) 48    Resp  Min: 16  Max: 24  19    SpO2  Min: 97 %  Max: 100 %  98 %      HT: 5' 6" (167.6 cm)  WT: 70 kg (154 lb 5.2 oz)  BMI: 24.9     Intake/output:  Intake/Output - Last 3 Shifts         11/14 0700  11/15 0659 11/15 0700  11/16 0659    I.V. (mL/kg)  0.5 (0)    IV Piggyback 1002.1     Total Intake(mL/kg) 1002.1 (14.3) 0.5 (0)    Net +1002.1 +0.5                  Intake/Output Summary (Last 24 hours) at 11/16/2023 0550  Last data filed at 11/15/2023 1455  Gross per 24 hour   Intake 0.5 ml   Output --   Net 0.5 ml             Lines/drains/airway:       Peripheral IV - Single Lumen 11/14/23 2258 18 G Left Antecubital (Active)   Site Assessment Clean;Dry;Intact 11/16/23 0200   Extremity Assessment Distal to IV No abnormal discoloration 11/15/23 2000   Line Status Infusing 11/16/23 0200   Dressing Status Clean;Dry;Intact 11/16/23 0200   Dressing Intervention Integrity maintained 11/16/23 0200   Reason Not Rotated Not due 11/15/23 1425   Number of days: 1       Physical examination:  Gen: NAD, answering questions appropriately  CV: RRR, +2 radial pulses  Resp: NWOB  Abd: Abdomen soft, nondistended, appropriately tender. Incisions c/d/I  MSK: moving all extremities     Labs:  Renal:  Recent Labs     11/14/23  2256 11/15/23  0404   BUN 7.1 7.4   CREATININE 0.64 0.66       No results for input(s): "LACTIC" in the last 72 hours.  LISA:  Recent Labs     11/14/23  2256 11/15/23  0404    141   K 3.2* 4.1   CO2 23 23   CALCIUM 9.4 9.1 " "  ALBUMIN 3.4* 3.2*   BILITOT 0.5 0.8   AST 13 12   ALKPHOS 114 106   ALT 18 16       Heme:  Recent Labs     11/14/23  2255 11/14/23  2256 11/15/23  0404   HGB 12.8  --  11.9*   HCT 40.3  --  38.2     --  176   INR  --  1.0  --        ID:  Recent Labs     11/14/23  2255 11/15/23  0404   WBC 12.35* 10.72       CBG:  Recent Labs     11/14/23  2256 11/15/23  0404   GLUCOSE 147* 121*        Cardiovascular:  Recent Labs   Lab 11/14/23 2255   TROPONINI <0.010       ABG:  No results for input(s): "PH", "PO2", "PCO2", "HCO3", "BE" in the last 168 hours.   I have reviewed all pertinent lab results within the past 24 hours.    Imaging:  US Abdomen Limited   Final Result   Impression:      1. There is a single gallstone in the gallbladder neck measuring 0.9 cm. The sonographic [place] sign is positive.  Please correlate clinically.  This could represent acute cholecystitis.      No significant discrepancy with overnight report.         Electronically signed by: Melchor Pandey   Date:    11/15/2023   Time:    06:27      X-Ray Chest 1 View   ED Interpretation   Normal cardiomediastinal silhouette. No dense lobar consolidation or pneumothorax.      Final Result      No acute findings.         Electronically signed by: Jimmy Farley   Date:    11/15/2023   Time:    10:13         I have reviewed all pertinent imaging results/findings within the past 24 hours.    Micro/Path/Other:  Microbiology Results (last 7 days)       Procedure Component Value Units Date/Time    Blood Culture [[id number]] Collected: 11/15/23 0424    Order Status: Resulted Specimen: Blood from Arm, Right Updated: 11/15/23 0459    Blood Culture [[id number]] Collected: 11/15/23 0404    Order Status: Resulted Specimen: Blood from Arm, Right Updated: 11/15/23 0410           Specimen (168h ago, onward)       Start     Ordered    11/15/23 1404  Specimen to Pathology  RELEASE UPON ORDERING        References:    Click here for ordering Quick Tip   Question:  Release " to patient  Answer:  Immediate    11/15/23 2794                     Assessment & Plan:   Apoorva Garcia is a 39yo F with history of hyperthyroidism who presented with early acute cholecystitis s/p lap marj 11/15.     - Regular diet  - Pain control  - Encourage ambulation  - Stop IVF  - Likely discharge home this AM     Note written with assistance from Lawrence Castillo, MS3 and edited as needed above.     Bandar Fischer MD  [facility] General Surgery, PGY-3

## 2023-11-16 NOTE — PLAN OF CARE
Problem: Adult Inpatient Plan of Care  Goal: Plan of Care Review  Flowsheets (Taken 11/15/2023 2238)  Plan of Care Reviewed With: patient     Problem: Bleeding (Cholecystectomy)  Goal: Absence of Bleeding  Outcome: Ongoing, Progressing  Intervention: Monitor and Manage Bleeding  Flowsheets (Taken 11/15/2023 2238)  Bleeding Management: dressing monitored     Problem: Fluid and Electrolyte Imbalance (Cholecystectomy)  Goal: Fluid and Electrolyte Balance  Outcome: Ongoing, Progressing  Intervention: Monitor and Manage Fluid and Electrolyte Balance  Flowsheets (Taken 11/15/2023 2238)  Fluid/Electrolyte Management: fluids provided     Problem: Ongoing Anesthesia Effects (Cholecystectomy)  Goal: Anesthesia/Sedation Recovery  Outcome: Ongoing, Progressing  Intervention: Optimize Anesthesia Recovery  Flowsheets (Taken 11/15/2023 2238)  Safety Promotion/Fall Prevention:   assistive device/personal item within reach   medications reviewed   lighting adjusted     Problem: Pain (Cholecystectomy)  Goal: Acceptable Pain Control  Outcome: Ongoing, Progressing  Intervention: Prevent or Manage Pain  Flowsheets (Taken 11/15/2023 2238)  Pain Management Interventions:   care clustered   medication offered   relaxation techniques promoted

## 2023-11-16 NOTE — PT/OT/SLP DISCHARGE
POST DISCHARGE DOCUMENTATION - 11/16/2023 1:09 PM    Physical Therapy Discharge Summary    Name: Apoorva Garcia  MRN: [id number]   Principal Problem: Calculus of gallbladder with acute cholecystitis without obstruction   1. Cough, unspecified type    2. Epigastric abdominal pain    3. Calculus of gallbladder with acute cholecystitis without obstruction       Patient Active Problem List   Diagnosis    Calculus of gallbladder with acute cholecystitis without obstruction      Recommendations - per last treatment session     Therapy Intensity Recommendations at Discharge: No Therapy Indicated  Discharge Equipment Recommendations: none     Assessment:     Refer to prior Physical Therapy note dated 11/16/2023 (evaluation) for last known functional status of patient.    -Patient was unexpectedly discharged from hospital.  Refer to therapy's initial evaluation or last treatment note for patient's most recent functional status and goal achievement and therapists' recommendations.  -Patient was seen by therapy for evaluation only prior to hospital discharge.    -continued: up-to-chair, ambulation, with progression of gait distance/frequency/duration and speed, as tolerated/appropriate, with assistance and supervision (AS ORDERED BY M.D.)    Objective     GOALS: 0 out of 5 STG's met by patient 2/2 patient seen for evaluation only prior to hospital discharge    Multidisciplinary Problems       Physical Therapy Goals       Problem: Physical Therapy    Goal Priority Disciplines Outcome Goal Variances Interventions   Physical Therapy Goal     PT, PT/OT      Description: Goals to be met by: discharge     Patient will increase functional independence with mobility by performing:    -. Supine to sit with [place] - NOT MET  -. Sit to supine with [place] - NOT MET  -. Sit to stand transfer with [place] - NOT MET  -. Gait  x 260 feet x2 w/ sitting rest x3 minutes b/w sessions with Modified [place] using No  Assistive Device - NOT MET  -. Ascend/descend 4 stair with bilateral Handrails Modified [place] using No Assistive Device - NOT MET  ESTABLISHED 11/16/2023           Plan     Patient Discharged to: home or self care per chart.    11/16/2023

## 2023-11-16 NOTE — MEDICAL/APP STUDENT
[facility] - [address] [place] [address] Telemetry  Discharge Summary      Admit Date: 11/14/2023    Discharge Date and Time:  11/16/2023 6:03 AM    Attending Physician: Austin Ulrich Jr.,*     Reason for Admission: Acute Cholecystitis    Procedures Performed: Procedure(s) (LRB):  CHOLECYSTECTOMY, LAPAROSCOPIC (N/A)    Hospital Course: Apoorva Garcia is a 38 y.o. female with PMH of hyperthyroidism and H.Pylori infection who presented on 11/15 to [facility] ED with a one day hx of acute onset RUQ abdominal pain and leukocytosis to 12 without associated fever, chills, or N/V. RUQ U/S performed at the time of presentation noted single gallstone in gallbladder neck at 0.9cm and positive sonographic Dun[place]'s sign. Patient was provided IV Zosyn and mIVF and scheduled for laparoscopic cholecystectomy on 11/15, which was tolerated without intraoperative complication. Patient remained inpatient overnight following surgery with subsequent decrease in WBC count to 10, resumption of ambulation and regular diet, and decrease in abdominal pain. Patient stable for discharge on 11/16 with follow up in [facility] surgery clinic in two weeks.    Goals of Care Treatment Preferences:  Code Status: Full Code    Consults: general surgery    Significant Diagnostic Studies: Radiology: Ultrasound:  Clinical History:RUQ pain, positive dunham sign.     Findings:  Liver:Unremarkable with no intrahepatic duct dilatation. The liver measures 14.9 centimeters in greatest dimension and demonstrates slight increased echogenicity which may reflect fatty infiltration. Correlate with clinical and laboratory findings.  Biliary ducts:The common bile duct is within normal limits at 5.2 mm in diameter.  Gallbladder:There is a single gallstone in the gallbladder neck measuring 0.9 cm. The gallbladder wall measures 1.8 mm in thickness. There is no pericholecystic fluid. The sonographic [place] sign is positive. This could represent acute  cholecystitis.  Pancreas:No mass, pancreatitis, or ductal dilatation.  Kidneys:No stones hydronephrosis or mass.  Right kidney:  Dimensions:The right kidney measures 10 sagittal by 4.8 by 4.8 in dimension and appears unremarkable.  Vascular:  IVC:The IVC is within normal limits.  Impression  1. There is a single gallstone in the gallbladder neck measuring 0.9 cm. The sonographic [place] sign is positive.  Please correlate clinically.  This could represent acute cholecystitis.        Final Diagnoses:    Principal Problem: Acute Cholecystits   Secondary Diagnoses: There are no hospital problems to display for this patient.     Discharged Condition: good    Disposition: Home or Self Care    Follow Up/Patient Instructions: Provided with ED precautions. Return to surgery clinic in 2 weeks for post operative follow up.     Medications:  Reconciled Home Medications:      Medication List        ASK your doctor about these medications      albuterol 90 mcg/actuation inhaler  Commonly known as: PROVENTIL/VENTOLIN HFA  Inhale 2 puffs into the lungs every 4 (four) hours as needed.     benzonatate 100 MG capsule  Commonly known as: TESSALON  Take 100 mg by mouth 3 (three) times daily as needed for Cough.     methIMAzole 10 MG Tab  Commonly known as: TAPAZOLE  Take 5 mg by mouth 3 (three) times daily.     oxybutynin 10 MG 24 hr tablet  Commonly known as: DITROPAN-XL  Take 10 mg by mouth 2 (two) times a day.     propranoloL 120 mg Cp24  Take by mouth.            No discharge procedures on file.    Lawrence Castillo, MS3

## 2023-11-19 ENCOUNTER — HOSPITAL ENCOUNTER (EMERGENCY)
Facility: HOSPITAL | Age: 38
Discharge: HOME | End: 2023-11-20
Attending: FAMILY MEDICINE
Payer: COMMERCIAL

## 2023-11-19 DIAGNOSIS — R10.13 EPIGASTRIC PAIN: ICD-10-CM

## 2023-11-19 DIAGNOSIS — K80.00 CALCULUS OF GALLBLADDER WITH ACUTE CHOLECYSTITIS WITHOUT OBSTRUCTION: ICD-10-CM

## 2023-11-19 DIAGNOSIS — G89.18 POSTOPERATIVE PAIN: Primary | ICD-10-CM

## 2023-11-19 LAB
ALBUMIN SERPL-MCNC: 3.7 G/DL (ref 3.5–5)
ALBUMIN/GLOB SERPL: 1.2 RATIO (ref 1.1–2)
ALP SERPL-CCNC: 123 UNIT/L (ref 40–150)
ALT SERPL-CCNC: 20 UNIT/L (ref 0–55)
APPEARANCE UR: CLEAR
AST SERPL-CCNC: 15 UNIT/L (ref 5–34)
BACTERIA #/AREA URNS AUTO: ABNORMAL /HPF
BASOPHILS # BLD AUTO: 0.03 X10(3)/MCL
BASOPHILS NFR BLD AUTO: 0.3 %
BILIRUB SERPL-MCNC: 0.4 MG/DL
BILIRUB UR QL STRIP.AUTO: NEGATIVE
BUN SERPL-MCNC: 14.3 MG/DL (ref 7–18.7)
CALCIUM SERPL-MCNC: 9.4 MG/DL (ref 8.4–10.2)
CHLORIDE SERPL-SCNC: 103 MMOL/L (ref 98–107)
CO2 SERPL-SCNC: 26 MMOL/L (ref 22–29)
COLOR UR AUTO: ABNORMAL
CREAT SERPL-MCNC: 0.67 MG/DL (ref 0.55–1.02)
EOSINOPHIL # BLD AUTO: 0.51 X10(3)/MCL (ref 0–0.9)
EOSINOPHIL NFR BLD AUTO: 5.5 %
ERYTHROCYTE [DISTWIDTH] IN BLOOD BY AUTOMATED COUNT: 11.9 % (ref 11.5–17)
GFR SERPLBLD CREATININE-BSD FMLA CKD-EPI: >60 MLS/MIN/1.73/M2
GLOBULIN SER-MCNC: 3.1 GM/DL (ref 2.4–3.5)
GLUCOSE SERPL-MCNC: 94 MG/DL (ref 74–100)
GLUCOSE UR QL STRIP.AUTO: NORMAL
HCT VFR BLD AUTO: 40.9 % (ref 37–47)
HGB BLD-MCNC: 12.9 G/DL (ref 12–16)
HOLD SPECIMEN: NORMAL
HYALINE CASTS #/AREA URNS LPF: ABNORMAL /LPF
IMM GRANULOCYTES # BLD AUTO: 0.03 X10(3)/MCL (ref 0–0.04)
IMM GRANULOCYTES NFR BLD AUTO: 0.3 %
KETONES UR QL STRIP.AUTO: NEGATIVE
LEUKOCYTE ESTERASE UR QL STRIP.AUTO: 250
LIPASE SERPL-CCNC: 12 U/L
LYMPHOCYTES # BLD AUTO: 1.71 X10(3)/MCL (ref 0.6–4.6)
LYMPHOCYTES NFR BLD AUTO: 18.5 %
MCH RBC QN AUTO: 25.9 PG (ref 27–31)
MCHC RBC AUTO-ENTMCNC: 31.5 G/DL (ref 33–36)
MCV RBC AUTO: 82.1 FL (ref 80–94)
MONOCYTES # BLD AUTO: 0.83 X10(3)/MCL (ref 0.1–1.3)
MONOCYTES NFR BLD AUTO: 9 %
NEUTROPHILS # BLD AUTO: 6.14 X10(3)/MCL (ref 2.1–9.2)
NEUTROPHILS NFR BLD AUTO: 66.4 %
NITRITE UR QL STRIP.AUTO: NEGATIVE
NRBC BLD AUTO-RTO: 0 %
PH UR STRIP.AUTO: 5.5 [PH]
PLATELET # BLD AUTO: 218 X10(3)/MCL (ref 130–400)
PMV BLD AUTO: 12.7 FL (ref 7.4–10.4)
POTASSIUM SERPL-SCNC: 4.2 MMOL/L (ref 3.5–5.1)
PROT SERPL-MCNC: 6.8 GM/DL (ref 6.4–8.3)
PROT UR QL STRIP.AUTO: NEGATIVE
RBC # BLD AUTO: 4.98 X10(6)/MCL (ref 4.2–5.4)
RBC #/AREA URNS AUTO: ABNORMAL /HPF
RBC UR QL AUTO: ABNORMAL
SODIUM SERPL-SCNC: 139 MMOL/L (ref 136–145)
SP GR UR STRIP.AUTO: 1.02 (ref 1–1.03)
SQUAMOUS #/AREA URNS LPF: ABNORMAL /HPF
UROBILINOGEN UR STRIP-ACNC: NORMAL
WBC # SPEC AUTO: 9.25 X10(3)/MCL (ref 4.5–11.5)
WBC #/AREA URNS AUTO: ABNORMAL /HPF

## 2023-11-19 PROCEDURE — 25000003 PHARM REV CODE 250: Performed by: FAMILY MEDICINE

## 2023-11-19 PROCEDURE — 85025 COMPLETE CBC W/AUTO DIFF WBC: CPT | Performed by: FAMILY MEDICINE

## 2023-11-19 PROCEDURE — 83690 ASSAY OF LIPASE: CPT | Performed by: FAMILY MEDICINE

## 2023-11-19 PROCEDURE — 96361 HYDRATE IV INFUSION ADD-ON: CPT

## 2023-11-19 PROCEDURE — 81001 URINALYSIS AUTO W/SCOPE: CPT | Performed by: FAMILY MEDICINE

## 2023-11-19 PROCEDURE — 63600175 PHARM REV CODE 636 W HCPCS: Performed by: FAMILY MEDICINE

## 2023-11-19 PROCEDURE — 96374 THER/PROPH/DIAG INJ IV PUSH: CPT

## 2023-11-19 PROCEDURE — 25500020 PHARM REV CODE 255

## 2023-11-19 PROCEDURE — 99285 EMERGENCY DEPT VISIT HI MDM: CPT | Mod: 25

## 2023-11-19 PROCEDURE — 94761 N-INVAS EAR/PLS OXIMETRY MLT: CPT

## 2023-11-19 PROCEDURE — 80053 COMPREHEN METABOLIC PANEL: CPT | Performed by: FAMILY MEDICINE

## 2023-11-19 PROCEDURE — 96375 TX/PRO/DX INJ NEW DRUG ADDON: CPT

## 2023-11-19 RX ORDER — KETOROLAC TROMETHAMINE 30 MG/ML
15 INJECTION, SOLUTION INTRAMUSCULAR; INTRAVENOUS
Status: COMPLETED | OUTPATIENT
Start: 2023-11-19 | End: 2023-11-19

## 2023-11-19 RX ORDER — PANTOPRAZOLE SODIUM 40 MG/1
40 TABLET, DELAYED RELEASE ORAL DAILY
Qty: 30 TABLET | Refills: 11 | Status: SHIPPED | OUTPATIENT
Start: 2023-11-19 | End: 2024-11-18

## 2023-11-19 RX ORDER — ONDANSETRON 2 MG/ML
4 INJECTION INTRAMUSCULAR; INTRAVENOUS
Status: COMPLETED | OUTPATIENT
Start: 2023-11-19 | End: 2023-11-19

## 2023-11-19 RX ORDER — ONDANSETRON 4 MG/1
4 TABLET, FILM COATED ORAL 2 TIMES DAILY
Qty: 30 TABLET | Refills: 0 | Status: SHIPPED | OUTPATIENT
Start: 2023-11-19 | End: 2023-12-04

## 2023-11-19 RX ADMIN — SODIUM CHLORIDE 1000 ML: 9 INJECTION, SOLUTION INTRAVENOUS at 09:11

## 2023-11-19 RX ADMIN — KETOROLAC TROMETHAMINE 15 MG: 30 INJECTION, SOLUTION INTRAMUSCULAR at 09:11

## 2023-11-19 RX ADMIN — ONDANSETRON 4 MG: 2 INJECTION INTRAMUSCULAR; INTRAVENOUS at 09:11

## 2023-11-19 RX ADMIN — IOHEXOL 100 ML: 350 INJECTION, SOLUTION INTRAVENOUS at 09:11

## 2023-11-20 VITALS
OXYGEN SATURATION: 98 % | HEART RATE: 76 BPM | TEMPERATURE: 99 F | RESPIRATION RATE: 20 BRPM | WEIGHT: 153.13 LBS | SYSTOLIC BLOOD PRESSURE: 118 MMHG | HEIGHT: 66 IN | DIASTOLIC BLOOD PRESSURE: 56 MMHG | BODY MASS INDEX: 24.61 KG/M2

## 2023-11-20 PROBLEM — R10.13 EPIGASTRIC PAIN: Status: ACTIVE | Noted: 2023-11-20

## 2023-11-20 LAB
BACTERIA BLD CULT: NORMAL
BACTERIA BLD CULT: NORMAL
ESTROGEN SERPL-MCNC: NORMAL PG/ML
INSULIN SERPL-ACNC: NORMAL U[IU]/ML
LAB AP CLINICAL INFORMATION: NORMAL
LAB AP GROSS DESCRIPTION: NORMAL
LAB AP REPORT FOOTNOTES: NORMAL
T3RU NFR SERPL: NORMAL %

## 2023-11-20 NOTE — CONSULTS
[facility]/General Surgery  CONSULT NOTE  Chief Complaint:   Abdominal pain    History of Present Illness:  Apoorva Garcia is a 38 y.o. female with PMHx H pylori now POD 4 for laparoscopic cholecystectomy presenting with 1 day of worsening epigastric pain associated with nausea. Pain began to worsen this morning around 9 AM and has been unremitting. Endorses nausea but episodes of emesis. Is tolerating PO but is self limiting intake due to nausea. Denies fever, chills, hematochezia, melena, constipation, or diarrhea. Last BM was this morning. In ED is afebrile and hemodynamically with all labs wnl. CT abd pelvis shows small amount of fluid in gallbladder fossa consistent with postoperative changes.    Review of Systems:  Negative other than stated in HPI on 10 point review of systems.     Allergies:  Review of patient's allergies indicates:   Allergen Reactions    Cefdinir Rash       Home Medications:  No current facility-administered medications on file prior to encounter.     Current Outpatient Medications on File Prior to Encounter   Medication Sig    albuterol (PROVENTIL/VENTOLIN HFA) 90 mcg/actuation inhaler Inhale 2 puffs into the lungs every 4 (four) hours as needed.    benzonatate (TESSALON) 100 MG capsule Take 100 mg by mouth 3 (three) times daily as needed for Cough.    HYDROcodone-acetaminophen (NORCO) 5-325 mg per tablet Take 1 tablet by mouth every 6 (six) hours as needed for Pain.    methIMAzole (TAPAZOLE) 10 MG Tab Take 5 mg by mouth 3 (three) times daily.    oxybutynin (DITROPAN-XL) 10 MG 24 hr tablet Take 10 mg by mouth 2 (two) times a day.    propranoloL 120 mg Cp24 Take by mouth.       Past Medical History:  History reviewed. No pertinent past medical history.    Past Surgical History:  Past Surgical History:   Procedure Laterality Date    LAPAROSCOPIC CHOLECYSTECTOMY N/A 11/15/2023    Procedure: CHOLECYSTECTOMY, LAPAROSCOPIC;  Surgeon: Tessa Barfield MD;  Location: [facility];  Service:  General;  Laterality: N/A;       Family History:   History reviewed. No pertinent family history.    Social History:   Social History     Tobacco Use    Smoking status: Never    Smokeless tobacco: Never   Substance Use Topics    Alcohol use: Never    Drug use: Not Currently        Vital Signs:  Temp: 98.6 °F (37 °C) (11/19/23 2016)  Pulse: 71 (11/19/23 2201)  Resp: 20 (11/19/23 2016)  BP: 127/64 (11/19/23 2201)  SpO2: 100 % (11/19/23 2201)    Physical Exam:  General: NAD  HEENT: normocephalic, mucous membranes moist, no scleral icterus  Neck: supple, symmetrical, no JVD  Lungs:  clear to auscultation bilaterally and normal respiratory effort  Cardiovascular: RRR  Extremities: moving all extremities, distal pulses palpable and symmetric  Abdomen: Soft and non distended. Laparoscopic incisions are c/d/I. TTP primarily in epigastric region with pain radiating to RUQ   Skin: turgor normal. No rashes or lesions  Neurologic: No focal numbness or weakness  Psych/Behavioral:  A&Ox3, appropriate affect.    Laboratory:  Recent Labs     11/19/23 2059   WBC 9.25   HGB 12.9   HCT 40.9         K 4.2   CO2 26   BUN 14.3   CREATININE 0.67   BILITOT 0.4   AST 15   ALT 20   ALKPHOS 123   CALCIUM 9.4   ALBUMIN 3.7       Diagnostic Results:  Impression:  1. Trace free fluid is seen in the pelvis (series 2 image 111).  2. There is interval cholecystectomy. There is some fluid in the gallbladder fossa with a locule of gas. These findings are likely postoperative. Correlate clinically as regards further evaluation and follow-up to full resolution as indicated.  3. Details and other findings as discussed above.    ASSESSMENT:     Apoorva Garcia is a 38 y.o. female with PMHx of H pylori now POD 4 for laparoscopic cholecystectomy presenting with 1 day of worsening epigastric pain associated with nausea. VSS and all labs and imaging wnl.    PLAN:     - Component of pain most likely related to chronic gastritis. Not  currently taking a PPI. Start Protonix 40 mg daily  - Will prescribe zofran PRN for nausea  - States prescribed norco adequately controls pain, continue taking  - Instructed to reach out to clinic to schedule earlier appointment if symptoms do not improve    Jose Campos MD  [facility] General Surgery PGY-1  11:03 PM

## 2023-11-20 NOTE — ED PROVIDER NOTES
Encounter Date: 11/19/2023       History     Chief Complaint   Patient presents with    pain after having gallbladder sx on Wed     Patient 38-year-old female presents emergency room complaints of right upper quadrant abdominal pain nausea and vomiting.  Patient is status post cholecystectomy 4 days prior, reports initially improving following surgery, but having worsening abdominal pain with nausea and vomiting today.  Reports having normal bowel movements, last bowel movement today.  Denies fever chills.  Reports increased pain when taking deep breaths.    The history is provided by the patient.     Review of patient's allergies indicates:   Allergen Reactions    Cefdinir Rash     History reviewed. No pertinent past medical history.  Past Surgical History:   Procedure Laterality Date    LAPAROSCOPIC CHOLECYSTECTOMY N/A 11/15/2023    Procedure: CHOLECYSTECTOMY, LAPAROSCOPIC;  Surgeon: Tessa Barfield MD;  Location: [facility];  Service: General;  Laterality: N/A;     History reviewed. No pertinent family history.  Social History     Tobacco Use    Smoking status: Never    Smokeless tobacco: Never   Substance Use Topics    Alcohol use: Never    Drug use: Not Currently     Review of Systems   Constitutional:  Negative for chills, fatigue and fever.   HENT:  Negative for ear pain, rhinorrhea and sore throat.    Eyes:  Negative for photophobia and pain.   Respiratory:  Negative for cough, shortness of breath and wheezing.    Cardiovascular:  Negative for chest pain.   Gastrointestinal:  Positive for abdominal pain, nausea and vomiting. Negative for diarrhea.   Genitourinary:  Negative for dysuria.   Neurological:  Negative for dizziness, weakness and headaches.   All other systems reviewed and are negative.      Physical Exam     Initial Vitals [11/19/23 2016]   BP Pulse Resp Temp SpO2   119/62 67 20 98.6 °F (37 °C) 98 %      MAP       --         Physical Exam    Nursing note and vitals reviewed.  Constitutional: She  appears well-developed and well-nourished.   Patient appears uncomfortable secondary to pain, nontoxic appearing   HENT:   Head: Normocephalic and atraumatic.   Eyes: Conjunctivae and EOM are normal. Pupils are equal, round, and reactive to light.   Neck: Neck supple.   Normal range of motion.  Cardiovascular:  Normal rate, regular rhythm and normal heart sounds.           Pulmonary/Chest: Breath sounds normal. No respiratory distress. She has no wheezes. She has no rhonchi. She has no rales.   Abdominal: Abdomen is soft. Bowel sounds are normal. She exhibits no distension. There is abdominal tenderness.   Moderate tenderness to palpation right upper quadrant.  Increased pain when taking deep breaths and also with coughing.  No rebound or guarding. There is no rebound and no guarding.   Musculoskeletal:         General: Normal range of motion.      Cervical back: Normal range of motion and neck supple.     Neurological: She is alert and oriented to person, place, and time.   Skin: Skin is warm and dry. Capillary refill takes less than 2 seconds. No erythema.   Psychiatric: She has a normal mood and affect. Her behavior is normal. Judgment and thought content normal.         ED Course   Procedures  Labs Reviewed   URINALYSIS, REFLEX TO URINE CULTURE - Abnormal; Notable for the following components:       Result Value    Blood, UA 1+ (*)     Leukocyte Esterase,  (*)     WBC, UA 6-10 (*)     Squamous Epithelial Cells, UA Moderate (*)     All other components within normal limits   CBC WITH DIFFERENTIAL - Abnormal; Notable for the following components:    MCH 25.9 (*)     MCHC 31.5 (*)     MPV 12.7 (*)     All other components within normal limits   LIPASE - Normal   CBC W/ AUTO DIFFERENTIAL    Narrative:     The following orders were created for panel order CBC Auto Differential.  Procedure                               Abnormality         Status                     ---------                                -----------         ------                     CBC with Differential[[id number]]       Abnormal            Final result                 Please view results for these tests on the individual orders.   COMPREHENSIVE METABOLIC PANEL   EXTRA TUBES    Narrative:     The following orders were created for panel order EXTRA TUBES.  Procedure                               Abnormality         Status                     ---------                               -----------         ------                     Light Blue Top Hold[[id number]]                             Final result               Light Green Top Hold[[id number]]                            Final result               Lavender Top Hold[[id number]]                               Final result               Gold Top Hold[[id number]]                                   Final result                 Please view results for these tests on the individual orders.   LIGHT BLUE TOP HOLD   LIGHT GREEN TOP HOLD   LAVENDER TOP HOLD   GOLD TOP HOLD          Imaging Results              CT Abdomen Pelvis With IV Contrast (Preliminary result)  Result time 11/19/23 22:20:42      Preliminary result by Maximus Sun MD (11/19/23 22:20:42)                   Narrative:    START OF REPORT:  Technique: CT of the abdomen and pelvis was performed with axial images as well as sagittal and coronal reconstruction images with intravenous contrast but without oral contrast.    Comparison: Correlated with the prior study dated2023-11-15 00:06:43.    Clinical History: RUQabdominalpain,[id number].    Dosage Information: Automated Exposure Control was utilized.    Findings:  Lines and Tubes: None.  Thorax:  Lungs: The visualized lung bases appear unremarkable.  Pleura: No pleural effusion is seen.  Heart: The heart size is within normal limits.  Abdomen:  Abdominal Wall: No abdominal wall pathology is seen.  Liver: The liver appears unremarkable.  Biliary System: No intrahepatic or  extrahepatic biliary duct dilatation is seen.  Gallbladder: There is interval cholecystectomy. There is some fluid in the gallbladder fossa with a locule of gas. These findings are likely postoperative.  Pancreas: The pancreas appears unremarkable.  Spleen: The spleen appears unremarkable.  Adrenals: The adrenal glands appear unremarkable.  Kidneys: The kidneys appear unremarkable with no stones cysts masses or hydronephrosis.  Aorta: The abdominal aorta appears unremarkable.  IVC: Unremarkable.  Bowel:  Esophagus: The visualized esophagus appears unremarkable.  Stomach: The stomach appears unremarkable.  Duodenum: Unremarkable appearing duodenum.  Small Bowel: The small bowel appears unremarkable.  Colon: Nondistended.  Appendix: The appendix appears unremarkable (series 2 image 87-99).  Peritoneum: Trace free fluid is seen in the pelvis (series 2 image 111).    Pelvis:  Bladder: The bladder is nondistended but appears otherwise unremarkable.  Female:  Uterus: An intrauterine contraceptive device is seen in place.  Ovaries: The ovaries appear unremarkable.    Bony structures:  Dorsal Spine: The visualized dorsal spine appears unremarkable.  Bony Pelvis: The visualized bony structures of the pelvis appear unremarkable.      Impression:  1. Trace free fluid is seen in the pelvis (series 2 image 111).  2. There is interval cholecystectomy. There is some fluid in the gallbladder fossa with a locule of gas. These findings are likely postoperative. Correlate clinically as regards further evaluation and follow-up to full resolution as indicated.  3. Details and other findings as discussed above.                                         Medications   ondansetron injection 4 mg (4 mg Intravenous Given 11/19/23 2112)   sodium chloride 0.9% bolus 1,000 mL 1,000 mL (0 mLs Intravenous Stopped 11/19/23 2219)   ketorolac injection 15 mg (15 mg Intravenous Given 11/19/23 2113)   iohexoL (OMNIPAQUE 350) 350 mg iodine/mL injection  (100 mLs Intravenous Given 11/19/23 2145)     Medical Decision Making  Patient 38-year-old female presents emergency room with complaints of severe right upper quadrant abdominal pain, 4 days postoperative from cholecystectomy.  Will obtain laboratory evaluation including CBC CMP and lipase, will obtain CT scan for further evaluation.  Will continue to monitor.      Amount and/or Complexity of Data Reviewed  Labs: ordered.  Radiology: ordered.    Risk  Prescription drug management.               ED Course as of 11/19/23 6925   Sun Nov 19, 2023   2230 Patient appears in no distress; still remains with epigastric and RUQ abdominal pain.  Discussed with Dr. Campos with General Surgery, being that the patient is in the postsurgical interval, and he will come to evaluate the patient in the ED. [MW]   2357 General surgery has seen evaluated the patient.  Please see consultation note.  Okay to discharge patient to home and will follow up outpatient.  ER precautions given for any acute worsening. [MW]      ED Course User Index  [MW] Baljinder Lake MD                        Clinical Impression:  Final diagnoses:  [G89.18] Postoperative pain (Primary)          ED Disposition Condition    Discharge Stable          ED Prescriptions       Medication Sig Dispense Start Date End Date Auth. Provider    pantoprazole (PROTONIX) 40 MG tablet Take 1 tablet (40 mg total) by mouth once daily. 30 tablet 11/19/2023 11/18/2024 Jose Campos MD    ondansetron (ZOFRAN) 4 MG tablet Take 1 tablet (4 mg total) by mouth 2 (two) times daily. for 15 days 30 tablet 11/19/2023 12/4/2023 Jose Campos MD          Follow-up Information       Follow up With Specialties Details Why Contact Info    [facility] - Emergency Dept Emergency Medicine  As needed, If symptoms worsen [address] [facility] [address] [phone]    Primary Care Physician  In 5 days               Baljinder Lake MD  11/19/23 9878

## 2023-11-21 ENCOUNTER — LAB VISIT (OUTPATIENT)
Dept: LAB | Facility: HOSPITAL | Age: 38
End: 2023-11-21
Attending: STUDENT IN AN ORGANIZED HEALTH CARE EDUCATION/TRAINING PROGRAM
Payer: COMMERCIAL

## 2023-11-21 ENCOUNTER — PATIENT MESSAGE (OUTPATIENT)
Dept: SURGERY | Facility: CLINIC | Age: 38
End: 2023-11-21

## 2023-11-21 ENCOUNTER — OFFICE VISIT (OUTPATIENT)
Dept: SURGERY | Facility: CLINIC | Age: 38
End: 2023-11-21
Payer: COMMERCIAL

## 2023-11-21 VITALS
SYSTOLIC BLOOD PRESSURE: 116 MMHG | OXYGEN SATURATION: 97 % | HEART RATE: 66 BPM | DIASTOLIC BLOOD PRESSURE: 76 MMHG | BODY MASS INDEX: 24.72 KG/M2 | HEIGHT: 66 IN | WEIGHT: 153.81 LBS | TEMPERATURE: 98 F

## 2023-11-21 DIAGNOSIS — Z90.49 S/P LAPAROSCOPIC CHOLECYSTECTOMY: ICD-10-CM

## 2023-11-21 DIAGNOSIS — Z90.49 S/P LAPAROSCOPIC CHOLECYSTECTOMY: Primary | ICD-10-CM

## 2023-11-21 LAB
ALBUMIN SERPL-MCNC: 3.7 G/DL (ref 3.5–5)
ALBUMIN/GLOB SERPL: 1 RATIO (ref 1.1–2)
ALP SERPL-CCNC: 130 UNIT/L (ref 40–150)
ALT SERPL-CCNC: 21 UNIT/L (ref 0–55)
AST SERPL-CCNC: 19 UNIT/L (ref 5–34)
BASOPHILS # BLD AUTO: 0.05 X10(3)/MCL
BASOPHILS NFR BLD AUTO: 0.6 %
BILIRUB SERPL-MCNC: 0.6 MG/DL
BUN SERPL-MCNC: 10 MG/DL (ref 7–18.7)
CALCIUM SERPL-MCNC: 9.7 MG/DL (ref 8.4–10.2)
CHLORIDE SERPL-SCNC: 105 MMOL/L (ref 98–107)
CO2 SERPL-SCNC: 24 MMOL/L (ref 22–29)
CREAT SERPL-MCNC: 0.7 MG/DL (ref 0.55–1.02)
EOSINOPHIL # BLD AUTO: 0.46 X10(3)/MCL (ref 0–0.9)
EOSINOPHIL NFR BLD AUTO: 5.7 %
ERYTHROCYTE [DISTWIDTH] IN BLOOD BY AUTOMATED COUNT: 12.1 % (ref 11.5–17)
GFR SERPLBLD CREATININE-BSD FMLA CKD-EPI: >60 MLS/MIN/1.73/M2
GLOBULIN SER-MCNC: 3.8 GM/DL (ref 2.4–3.5)
GLUCOSE SERPL-MCNC: 109 MG/DL (ref 74–100)
HCT VFR BLD AUTO: 43 % (ref 37–47)
HGB BLD-MCNC: 13.8 G/DL (ref 12–16)
IMM GRANULOCYTES # BLD AUTO: 0.03 X10(3)/MCL (ref 0–0.04)
IMM GRANULOCYTES NFR BLD AUTO: 0.4 %
LYMPHOCYTES # BLD AUTO: 1.31 X10(3)/MCL (ref 0.6–4.6)
LYMPHOCYTES NFR BLD AUTO: 16.3 %
MCH RBC QN AUTO: 26.3 PG (ref 27–31)
MCHC RBC AUTO-ENTMCNC: 32.1 G/DL (ref 33–36)
MCV RBC AUTO: 81.9 FL (ref 80–94)
MONOCYTES # BLD AUTO: 0.63 X10(3)/MCL (ref 0.1–1.3)
MONOCYTES NFR BLD AUTO: 7.8 %
NEUTROPHILS # BLD AUTO: 5.57 X10(3)/MCL (ref 2.1–9.2)
NEUTROPHILS NFR BLD AUTO: 69.2 %
NRBC BLD AUTO-RTO: 0 %
PLATELET # BLD AUTO: 244 X10(3)/MCL (ref 130–400)
PMV BLD AUTO: 12.7 FL (ref 7.4–10.4)
POTASSIUM SERPL-SCNC: 4.1 MMOL/L (ref 3.5–5.1)
PROT SERPL-MCNC: 7.5 GM/DL (ref 6.4–8.3)
RBC # BLD AUTO: 5.25 X10(6)/MCL (ref 4.2–5.4)
SODIUM SERPL-SCNC: 139 MMOL/L (ref 136–145)
WBC # SPEC AUTO: 8.05 X10(3)/MCL (ref 4.5–11.5)

## 2023-11-21 PROCEDURE — 80053 COMPREHEN METABOLIC PANEL: CPT

## 2023-11-21 PROCEDURE — 36415 COLL VENOUS BLD VENIPUNCTURE: CPT

## 2023-11-21 PROCEDURE — 85025 COMPLETE CBC W/AUTO DIFF WBC: CPT

## 2023-11-21 PROCEDURE — 99213 OFFICE O/P EST LOW 20 MIN: CPT | Mod: PBBFAC

## 2023-11-21 RX ORDER — HYDROCODONE BITARTRATE AND ACETAMINOPHEN 5; 325 MG/1; MG/1
1 TABLET ORAL EVERY 6 HOURS PRN
Qty: 16 TABLET | Refills: 0 | Status: SHIPPED | OUTPATIENT
Start: 2023-11-21

## 2023-11-21 NOTE — PROGRESS NOTES
I have reviewed the notes, assessments, and/or procedures performed by the resident, I concur with her/his documentation of Apoorva Garcia.     Tessa Barfield MD    CT reviewed- normal post op findings, labs drawn today wnl.

## 2023-11-21 NOTE — PROGRESS NOTES
[facility] Clinic Note    HPI: Apoorva Garcia is a 38 y.o. female with PMH of hyperthyroidism, H.Pylori infection and acute cholecystitis now s/p laparoscopic cholecystectomy on 11/15/23 presenting to clinic with RLQ pain and nausea. Patient states that these symptoms started 4 days after surgery and prior to that time, she was without pain, tolerating oral diet, and afebrile. She was seen in the ED on 11/19 with epigastric abdominal pain. She had CT A/P at that time that showed a small fluid collection in the gallbladder fossa and all labs were within normal limits. In clinic 11/21/23, patient continues to tolerate oral diet, have regular bowel movements, and denies emesis/fever/chills, but does continue to endorse RLQ pain. Patient has used Norco for the pain with little relief.     PMH: No past medical history on file.   Meds:   Current Outpatient Medications:     albuterol (PROVENTIL/VENTOLIN HFA) 90 mcg/actuation inhaler, Inhale 2 puffs into the lungs every 4 (four) hours as needed., Disp: , Rfl:     benzonatate (TESSALON) 100 MG capsule, Take 100 mg by mouth 3 (three) times daily as needed for Cough., Disp: , Rfl:     HYDROcodone-acetaminophen (NORCO) 5-325 mg per tablet, Take 1 tablet by mouth every 6 (six) hours as needed for Pain., Disp: 18 tablet, Rfl: 0    methIMAzole (TAPAZOLE) 10 MG Tab, Take 5 mg by mouth 3 (three) times daily., Disp: , Rfl:     ondansetron (ZOFRAN) 4 MG tablet, Take 1 tablet (4 mg total) by mouth 2 (two) times daily. for 15 days, Disp: 30 tablet, Rfl: 0    oxybutynin (DITROPAN-XL) 10 MG 24 hr tablet, Take 10 mg by mouth 2 (two) times a day., Disp: , Rfl:     pantoprazole (PROTONIX) 40 MG tablet, Take 1 tablet (40 mg total) by mouth once daily., Disp: 30 tablet, Rfl: 11    propranoloL 120 mg Cp24, Take by mouth., Disp: , Rfl:     Allergies:   Review of patient's allergies indicates:   Allergen Reactions    Cefdinir Rash     Social History:   Social History     Tobacco  Use    Smoking status: Never    Smokeless tobacco: Never   Substance Use Topics    Alcohol use: Never    Drug use: Not Currently     Family History: No family history on file.  Surgical History:   Past Surgical History:   Procedure Laterality Date    LAPAROSCOPIC CHOLECYSTECTOMY N/A 11/15/2023    Procedure: CHOLECYSTECTOMY, LAPAROSCOPIC;  Surgeon: Tessa Barfield MD;  Location: [facility];  Service: General;  Laterality: N/A;     Review of Systems:  Respiratory: Denies shortness of breath or chest pain  Gastrointestinal: Endorses nausea.  Urinary: Denies dysuria or hematuria.  Vascular: Denies leg swelling.  Neuro: Denies motor deficits.     Objective:    Vitals:  There were no vitals filed for this visit.     Physical Exam:  Gen: NAD  Neuro: awake, alert, answering questions appropriately  CV: RRR, +2 radial pulses  Resp: non-labored breathing, JOSI  Abd: soft, Nondistended. Laparoscopic incision sites healing without surrounding erythema, edema, drainage. RLQ tenderness to palpation.  Ext: moves all 4 spontaneously and purposefully  Skin: warm, well perfused    Imaging:  CT Abdomen/Pelvis with IV Contrast 11/19/23  Technique:CT of the abdomen and pelvis was performed with axial images as well as sagittal and coronal reconstruction images with intravenous contrast but without oral contrast.  Clinical History:RUQ abdominal pain, postop Cholecystectomy 4 days prior.  Gallbladder:There is interval cholecystectomy. There is some fluid in the gallbladder fossa with a locule of gas. These findings are likely postoperative.  Bowel:  Appendix:The appendix appears unremarkable (series 2 image 87-99).  Peritoneum:Trace free fluid is seen in the pelvis (series 2 image 111).      1. Trace free fluid is seen in the pelvis (series 2 image 111).  2. There is interval cholecystectomy. There is some fluid in the gallbladder fossa with a locule of gas. These findings are likely postoperative. Correlate clinically as regards further  evaluation and follow-up to full resolution as indicated.    Assessment/Plan:  Apoorva Garcia is a 38 y.o. female with PMH of hyperthyroidism, H.Pylori infection, and acute cholecystitis now s/p laparoscopic cholecystectomy on 11/15/23 presenting to clinic for surgery follow up with persistent RUQ pain.     - Imaging and labs on 11/19 all within normal limits  - Will obtain repeat stat labs to further evaluate  - Further plan of care pending labs     Note written with assistance from Lawrence Castillo MS3 and edited as needed above.     Bandar Fischer MD  [employer] General Surgery, PGY-3  11/21/2023 9:49 AM

## 2023-11-21 NOTE — PROGRESS NOTES
Patient seen by Dr. MIGUELITO Fischer. Will return for regular schedule visit. Written and verbal discharge instructions given.

## 2023-11-22 ENCOUNTER — HOSPITAL ENCOUNTER (EMERGENCY)
Facility: HOSPITAL | Age: 38
Discharge: HOME | End: 2023-11-22
Attending: EMERGENCY MEDICINE
Payer: COMMERCIAL

## 2023-11-22 VITALS
BODY MASS INDEX: 24.2 KG/M2 | DIASTOLIC BLOOD PRESSURE: 81 MMHG | TEMPERATURE: 98 F | SYSTOLIC BLOOD PRESSURE: 136 MMHG | HEIGHT: 66 IN | OXYGEN SATURATION: 99 % | HEART RATE: 63 BPM | WEIGHT: 150.56 LBS | RESPIRATION RATE: 18 BRPM

## 2023-11-22 DIAGNOSIS — R10.9 ABDOMINAL PAIN, UNSPECIFIED ABDOMINAL LOCATION: ICD-10-CM

## 2023-11-22 DIAGNOSIS — N30.00 ACUTE CYSTITIS WITHOUT HEMATURIA: Primary | ICD-10-CM

## 2023-11-22 LAB
ALBUMIN SERPL-MCNC: 3.4 G/DL (ref 3.5–5)
ALBUMIN/GLOB SERPL: 1 RATIO (ref 1.1–2)
ALP SERPL-CCNC: 112 UNIT/L (ref 40–150)
ALT SERPL-CCNC: 16 UNIT/L (ref 0–55)
APPEARANCE UR: ABNORMAL
AST SERPL-CCNC: 16 UNIT/L (ref 5–34)
B-HCG UR QL: NEGATIVE
BACTERIA #/AREA URNS AUTO: ABNORMAL /HPF
BASOPHILS # BLD AUTO: 0.05 X10(3)/MCL
BASOPHILS NFR BLD AUTO: 0.6 %
BILIRUB SERPL-MCNC: 0.4 MG/DL
BILIRUB UR QL STRIP.AUTO: NEGATIVE
BUN SERPL-MCNC: 12 MG/DL (ref 7–18.7)
CALCIUM SERPL-MCNC: 9.5 MG/DL (ref 8.4–10.2)
CHLORIDE SERPL-SCNC: 106 MMOL/L (ref 98–107)
CO2 SERPL-SCNC: 24 MMOL/L (ref 22–29)
COLOR UR AUTO: ABNORMAL
CREAT SERPL-MCNC: 0.72 MG/DL (ref 0.55–1.02)
CTP QC/QA: YES
EOSINOPHIL # BLD AUTO: 0.43 X10(3)/MCL (ref 0–0.9)
EOSINOPHIL NFR BLD AUTO: 5.6 %
ERYTHROCYTE [DISTWIDTH] IN BLOOD BY AUTOMATED COUNT: 12.3 % (ref 11.5–17)
GFR SERPLBLD CREATININE-BSD FMLA CKD-EPI: >60 MLS/MIN/1.73/M2
GLOBULIN SER-MCNC: 3.4 GM/DL (ref 2.4–3.5)
GLUCOSE SERPL-MCNC: 98 MG/DL (ref 74–100)
GLUCOSE UR QL STRIP.AUTO: NORMAL
HCT VFR BLD AUTO: 42 % (ref 37–47)
HGB BLD-MCNC: 13.1 G/DL (ref 12–16)
HOLD SPECIMEN: NORMAL
HOLD SPECIMEN: NORMAL
HYALINE CASTS #/AREA URNS LPF: ABNORMAL /LPF
IMM GRANULOCYTES # BLD AUTO: 0.02 X10(3)/MCL (ref 0–0.04)
IMM GRANULOCYTES NFR BLD AUTO: 0.3 %
KETONES UR QL STRIP.AUTO: NEGATIVE
LEUKOCYTE ESTERASE UR QL STRIP.AUTO: 500
LIPASE SERPL-CCNC: 9 U/L
LYMPHOCYTES # BLD AUTO: 1.38 X10(3)/MCL (ref 0.6–4.6)
LYMPHOCYTES NFR BLD AUTO: 17.9 %
MAGNESIUM SERPL-MCNC: 1.7 MG/DL (ref 1.6–2.6)
MCH RBC QN AUTO: 26 PG (ref 27–31)
MCHC RBC AUTO-ENTMCNC: 31.2 G/DL (ref 33–36)
MCV RBC AUTO: 83.3 FL (ref 80–94)
MONOCYTES # BLD AUTO: 0.77 X10(3)/MCL (ref 0.1–1.3)
MONOCYTES NFR BLD AUTO: 10 %
NEUTROPHILS # BLD AUTO: 5.06 X10(3)/MCL (ref 2.1–9.2)
NEUTROPHILS NFR BLD AUTO: 65.6 %
NITRITE UR QL STRIP.AUTO: NEGATIVE
NRBC BLD AUTO-RTO: 0 %
PH UR STRIP.AUTO: 7 [PH]
PLATELET # BLD AUTO: ABNORMAL 10*3/UL
PLATELETS.RETICULATED NFR BLD AUTO: 16.4 % (ref 0.9–11.2)
PMV BLD AUTO: 13.4 FL (ref 7.4–10.4)
POTASSIUM SERPL-SCNC: 3.9 MMOL/L (ref 3.5–5.1)
PROT SERPL-MCNC: 6.8 GM/DL (ref 6.4–8.3)
PROT UR QL STRIP.AUTO: NEGATIVE
RBC # BLD AUTO: 5.04 X10(6)/MCL (ref 4.2–5.4)
RBC #/AREA URNS AUTO: ABNORMAL /HPF
RBC UR QL AUTO: ABNORMAL
SODIUM SERPL-SCNC: 138 MMOL/L (ref 136–145)
SP GR UR STRIP.AUTO: 1.01 (ref 1–1.03)
SQUAMOUS #/AREA URNS LPF: ABNORMAL /HPF
UROBILINOGEN UR STRIP-ACNC: NORMAL
WBC # SPEC AUTO: 7.71 X10(3)/MCL (ref 4.5–11.5)
WBC #/AREA URNS AUTO: ABNORMAL /HPF

## 2023-11-22 PROCEDURE — 85025 COMPLETE CBC W/AUTO DIFF WBC: CPT | Performed by: PHYSICIAN ASSISTANT

## 2023-11-22 PROCEDURE — 82787 IGG 1 2 3 OR 4 EACH: CPT | Performed by: INTERNAL MEDICINE

## 2023-11-22 PROCEDURE — 63600175 PHARM REV CODE 636 W HCPCS: Performed by: EMERGENCY MEDICINE

## 2023-11-22 PROCEDURE — 81001 URINALYSIS AUTO W/SCOPE: CPT | Performed by: PHYSICIAN ASSISTANT

## 2023-11-22 PROCEDURE — 84439 ASSAY OF FREE THYROXINE: CPT | Performed by: INTERNAL MEDICINE

## 2023-11-22 PROCEDURE — 83690 ASSAY OF LIPASE: CPT | Performed by: PHYSICIAN ASSISTANT

## 2023-11-22 PROCEDURE — 84481 FREE ASSAY (FT-3): CPT | Performed by: INTERNAL MEDICINE

## 2023-11-22 PROCEDURE — 96374 THER/PROPH/DIAG INJ IV PUSH: CPT

## 2023-11-22 PROCEDURE — 83735 ASSAY OF MAGNESIUM: CPT | Performed by: EMERGENCY MEDICINE

## 2023-11-22 PROCEDURE — 99284 EMERGENCY DEPT VISIT MOD MDM: CPT | Mod: 25

## 2023-11-22 PROCEDURE — 25000003 PHARM REV CODE 250: Performed by: EMERGENCY MEDICINE

## 2023-11-22 PROCEDURE — 87086 URINE CULTURE/COLONY COUNT: CPT | Performed by: PHYSICIAN ASSISTANT

## 2023-11-22 PROCEDURE — 80053 COMPREHEN METABOLIC PANEL: CPT | Performed by: PHYSICIAN ASSISTANT

## 2023-11-22 PROCEDURE — 81025 URINE PREGNANCY TEST: CPT | Performed by: PHYSICIAN ASSISTANT

## 2023-11-22 RX ORDER — GABAPENTIN 300 MG/1
300 CAPSULE ORAL 3 TIMES DAILY
Status: DISCONTINUED | OUTPATIENT
Start: 2023-11-22 | End: 2023-11-22

## 2023-11-22 RX ORDER — ONDANSETRON 2 MG/ML
4 INJECTION INTRAMUSCULAR; INTRAVENOUS
Status: COMPLETED | OUTPATIENT
Start: 2023-11-22 | End: 2023-11-22

## 2023-11-22 RX ORDER — HYDROCODONE BITARTRATE AND ACETAMINOPHEN 10; 325 MG/1; MG/1
1 TABLET ORAL
Status: COMPLETED | OUTPATIENT
Start: 2023-11-22 | End: 2023-11-22

## 2023-11-22 RX ORDER — SULFAMETHOXAZOLE AND TRIMETHOPRIM 800; 160 MG/1; MG/1
1 TABLET ORAL 2 TIMES DAILY
Qty: 14 TABLET | Refills: 0 | Status: SHIPPED | OUTPATIENT
Start: 2023-11-22 | End: 2023-11-29

## 2023-11-22 RX ORDER — GABAPENTIN 300 MG/1
300 CAPSULE ORAL
Status: COMPLETED | OUTPATIENT
Start: 2023-11-22 | End: 2023-11-22

## 2023-11-22 RX ORDER — GABAPENTIN 300 MG/1
300 CAPSULE ORAL 3 TIMES DAILY
Qty: 30 CAPSULE | Refills: 0 | Status: SHIPPED | OUTPATIENT
Start: 2023-11-22 | End: 2023-12-02

## 2023-11-22 RX ORDER — ONDANSETRON 4 MG/1
4 TABLET, ORALLY DISINTEGRATING ORAL
Status: COMPLETED | OUTPATIENT
Start: 2023-11-22 | End: 2023-11-22

## 2023-11-22 RX ADMIN — GABAPENTIN 300 MG: 300 CAPSULE ORAL at 06:11

## 2023-11-22 RX ADMIN — ONDANSETRON 4 MG: 4 TABLET, ORALLY DISINTEGRATING ORAL at 09:11

## 2023-11-22 RX ADMIN — ONDANSETRON 4 MG: 2 INJECTION INTRAMUSCULAR; INTRAVENOUS at 06:11

## 2023-11-22 RX ADMIN — HYDROCODONE BITARTRATE AND ACETAMINOPHEN 1 TABLET: 10; 325 TABLET ORAL at 07:11

## 2023-11-22 NOTE — ED PROVIDER NOTES
ED PROVIDER NOTE  11/22/2023    CHIEF COMPLAINT:   Chief Complaint   Patient presents with    Abdominal Pain     RUQ abdominal pain, ongoing from recent cholecystectomy x 1 week with nausea       HISTORY OF PRESENT ILLNESS:   Apoorva Garcia is a 38 y.o. female who presents with chief complaint Abdominal pain. Onset was 4 days ago with burning right sided abdominal pain that has been constant, aggravated with movement, minimally relieved with norco. Associated symptoms of nausea. She reports having normal bowel movements.    The history is provided by the patient.         REVIEW OF SYSTEMS: as noted in the HPI.  NURSING NOTES REVIEWED      PAST MEDICAL/SURGICAL HISTORY:   Past Medical History:   Diagnosis Date    Thyroid disease       Past Surgical History:   Procedure Laterality Date    LAPAROSCOPIC CHOLECYSTECTOMY N/A 11/15/2023    Procedure: CHOLECYSTECTOMY, LAPAROSCOPIC;  Surgeon: Tessa Barfield MD;  Location: [facility];  Service: General;  Laterality: N/A;       FAMILY HISTORY: History reviewed. No pertinent family history.    SOCIAL HISTORY:   Social History     Tobacco Use    Smoking status: Never    Smokeless tobacco: Never   Substance Use Topics    Alcohol use: Never    Drug use: Not Currently       ALLERGIES:   Review of patient's allergies indicates:   Allergen Reactions    Bupropion Rash    Cefdinir Rash       PHYSICAL EXAM:  Initial Vitals   BP Pulse Resp Temp SpO2   11/22/23 1724 11/22/23 1722 11/22/23 1722 11/22/23 1722 11/22/23 1722   114/77 75 16 98.2 °F (36.8 °C) 99 %      MAP       --                Physical Exam    Nursing note and vitals reviewed.  Constitutional: She appears well-developed and well-nourished.   HENT:   Head: Normocephalic and atraumatic.   Mouth/Throat: Uvula is midline and mucous membranes are normal.   Eyes: EOM are normal. Pupils are equal, round, and reactive to light.   Neck: Trachea normal. Neck supple.   Cardiovascular:  Normal rate, regular rhythm and normal  pulses.           Pulmonary/Chest: Effort normal and breath sounds normal.   Abdominal: Abdomen is soft. Bowel sounds are normal. There is abdominal tenderness in the right upper quadrant and right lower quadrant. There is guarding. There is no rebound.   Musculoskeletal:         General: Normal range of motion.      Cervical back: Neck supple.     Neurological: She is alert and oriented to person, place, and time. GCS eye subscore is 4. GCS verbal subscore is 5. GCS motor subscore is 6.   Skin: Skin is warm and dry.   Psychiatric: She has a normal mood and affect. Her speech is normal. Thought content normal.         RESULTS:  Labs Reviewed   COMPREHENSIVE METABOLIC PANEL - Abnormal; Notable for the following components:       Result Value    Albumin Level 3.4 (*)     Albumin/Globulin Ratio 1.0 (*)     All other components within normal limits   URINALYSIS, REFLEX TO URINE CULTURE - Abnormal; Notable for the following components:    Appearance, UA Hazy (*)     Blood, UA 1+ (*)     Leukocyte Esterase,  (*)     WBC, UA 11-20 (*)     Bacteria, UA Few (*)     Squamous Epithelial Cells, UA Many (*)     All other components within normal limits   CBC WITH DIFFERENTIAL - Abnormal; Notable for the following components:    MCH 26.0 (*)     MCHC 31.2 (*)     MPV 13.4 (*)     IPF 16.4 (*)     All other components within normal limits   LIPASE - Normal   MAGNESIUM - Normal   CULTURE, URINE   CBC W/ AUTO DIFFERENTIAL    Narrative:     The following orders were created for panel order CBC auto differential.  Procedure                               Abnormality         Status                     ---------                               -----------         ------                     CBC with Differential[[id number]]       Abnormal            Final result                 Please view results for these tests on the individual orders.   EXTRA TUBES    Narrative:     The following orders were created for panel order EXTRA  TUBES.  Procedure                               Abnormality         Status                     ---------                               -----------         ------                     Light Blue Top Hold[[id number]]                             Final result               Gold Top Hold[[id number]]                                   Final result                 Please view results for these tests on the individual orders.   LIGHT BLUE TOP HOLD   GOLD TOP HOLD   POCT URINE PREGNANCY     Imaging Results    None         PROCEDURES:  Procedures    ECG:       ED COURSE AND MEDICAL DECISION MAKING:  Medications   ondansetron injection 4 mg (4 mg Intravenous Given 11/22/23 1854)   gabapentin capsule 300 mg (300 mg Oral Given 11/22/23 1854)   HYDROcodone-acetaminophen  mg per tablet 1 tablet (1 tablet Oral Given 11/22/23 1952)     ED Course as of 11/22/23 2124 Wed Nov 22, 2023 1835 WBC: 7.71 [IB]   1835 Hemoglobin: 13.1 [IB]   1835 Creatinine: 0.72 [IB]   1835 Magnesium : 1.70 [IB]   1835 Lipase: 9 [IB]   1835 NITRITE UA: Negative [IB]   1835 Leukocytes, UA(!): 500 [IB]   1835 WBC, UA(!): 11-20 [IB]   1835 Bacteria, UA(!): Few [IB]   1835 RBC, UA: 0-5 [IB]   1837 Preg Test, Ur: Negative [IB]   1940 Reports pain has improved some, currently 7/10 in severity. Will give norco and consult surgery. [IB]      ED Course User Index  [IB] Zachery Shabazz, DO        Medical Decision Making  38-year-old female who presents with burning right-sided abdominal pain over the past 4 days which began 4 days after undergoing laparoscopic cholecystectomy.  She was seen on 11/19 when this pain began and had unremarkable workup including a CT scan which showed postsurgical changes and has since been seen in surgery clinic and had repeat labs which were reassuring.  Labs today are essentially unchanged.  Urine does show evidence of infection and she reports that she has had some bladder discomfort so will empirically treat with Bactrim.   General surgery was consulted and has seen and evaluated the patient and does not feel she needs any further workup at this time.  She did report some improvement with gabapentin so we will discharge her with some gabapentin to take for her pain.  Cautioned for sedation with combination of gabapentin and her hydrocodone.  Instructed to follow up with surgery.  Given strict ED return precautions. I have spoken with the patient and/or caregivers. I have explained the patient's condition, diagnoses and treatment plan based on the information available to me at this time. I have answered the patient's and/or caregiver's questions and addressed any concerns. The patient and/or caregivers have as good an understanding of the patient's diagnosis, condition and treatment plan as can be expected at this point. The vital signs have been stable. The patient's condition is stable and appropriate for discharge from the emergency department.     The patient will pursue further outpatient evaluation with the primary care physician or other designated or consulting physician as outlined in the discharge instructions. The patient and/or caregivers are agreeable to this plan of care and follow-up instructions have been explained in detail. The patient and/or caregivers have received these instructions in written format and have expressed an understanding of the discharge instructions. The patient and/or caregivers are aware that any significant change in condition or worsening of symptoms should prompt an immediate return to this or the closest emergency department or a call to 911.    Problems Addressed:  Abdominal pain, unspecified abdominal location: complicated acute illness or injury     Details: Differential diagnosis includes intra-abdominal abscess, appendicitis, perforated hollow viscus    Amount and/or Complexity of Data Reviewed  Labs: ordered. Decision-making details documented in ED Course.    Risk  Prescription drug  management.        CLINICAL IMPRESSION:  1. Acute cystitis without hematuria    2. Abdominal pain, unspecified abdominal location        DISPOSITION:   ED Disposition Condition    Discharge Stable            ED Prescriptions       Medication Sig Dispense Start Date End Date Auth. Provider    gabapentin (NEURONTIN) 300 MG capsule Take 1 capsule (300 mg total) by mouth 3 (three) times daily. for 10 days 30 capsule 11/22/2023 12/2/2023 Zachery Shabazz,     sulfamethoxazole-trimethoprim 800-160mg (BACTRIM DS) 800-160 mg Tab Take 1 tablet by mouth 2 (two) times daily. for 7 days 14 tablet 11/22/2023 11/29/2023 Zachery Shabazz DO          Follow-up Information    None            Zachery Shabazz DO  11/22/23 2124

## 2023-11-22 NOTE — FIRST PROVIDER EVALUATION
"Medical screening examination initiated.  I have conducted a focused provider triage encounter, findings are as follows:    Brief history of present illness:  Patient who is 1 week post-op from [facility] presents today c/o persistent RUQ pain unrelieved with norco 5/325mg. She also endorses nausea.     Vitals:    11/22/23 1722   Pulse: 75   Resp: 16   Temp: 98.2 °F (36.8 °C)   TempSrc: Oral   SpO2: 99%   Weight: 68.3 kg (150 lb 9.2 oz)   Height: 5' 6" (1.676 m)       Pertinent physical exam:  Vitals stable. In no acute distress. No jaundice.     Brief workup plan:  Labs     Preliminary workup initiated; this workup will be continued and followed by the physician or advanced practice provider that is assigned to the patient when roomed.  "

## 2023-11-23 NOTE — CONSULTS
[facility]   Consult Note  General Surgery    Patient Name: Apoorva Garcia  YOB: 1985  Date of Admission: 11/22/2023  Date: 11/22/2023 9:24 PM  Reason for Consult: RUQ pain s/p lap marj    PRESENTING HISTORY     Chief Complaint/Reason for Admission: RUQ pain     History of Present Illness:  Apoorva Garcia is a 39 yo F with history of hypothyroidism and acute cholecystitis s/p lap marj on 11/15/23 who presents with RUQ pain. She was seen in the ED on 11/19 with similar pain. Labs were normal at that time and CT A/P was obtained with small fluid collection in the gallbladder fossa consistent with normal postoperative changes. She was seen 11/21 in clinic for follow up with these symptoms persisting and labs were obtained that remained normal. She re-presents to the ED today with persistent RUQ pain. She has associated nausea but no vomiting. She denies fevers or any other symptoms. She has been taking Norco without resolution of her pain. Repeat labs today remain normal with WBC 7.7, H/H 13.1/42 from 13.8/43 yesterday, Tbili 0.4, AST/ALT 16/16. UA obtained with few bacteria, 11-20 WBC, 500 leukocytes, +squamous epithelial cells.     Review of Systems:  12 point ROS negative except as stated in HPI    PAST HISTORY:   Past medical history:  Past Medical History:   Diagnosis Date    Thyroid disease      Past surgical history:  Past Surgical History:   Procedure Laterality Date    LAPAROSCOPIC CHOLECYSTECTOMY N/A 11/15/2023    Procedure: CHOLECYSTECTOMY, LAPAROSCOPIC;  Surgeon: Tessa Barfield MD;  Location: [facility];  Service: General;  Laterality: N/A;     Family history:  History reviewed. No pertinent family history.    Social history:  Social History     Socioeconomic History    Marital status:    Tobacco Use    Smoking status: Never    Smokeless tobacco: Never   Substance and Sexual Activity    Alcohol use: Never    Drug use: Not Currently    Sexual activity: Not  Currently     Social Determinants of Health     Financial Resource Strain: Low Risk  (11/15/2023)    Overall Financial Resource Strain (CARDIA)     Difficulty of Paying Living Expenses: Not hard at all   Food Insecurity: No Food Insecurity (11/15/2023)    Hunger Vital Sign     Worried About Running Out of Food in the Last Year: Never true     Ran Out of Food in the Last Year: Never true   Transportation Needs: No Transportation Needs (11/15/2023)    PRAPARE - Transportation     Lack of Transportation (Medical): No     Lack of Transportation (Non-Medical): No   Physical Activity: Inactive (11/15/2023)    Exercise Vital Sign     Days of Exercise per Week: 0 days     Minutes of Exercise per Session: 0 min   Stress: No Stress Concern Present (11/15/2023)    [employer] [place] [employer] - Occupational Stress Questionnaire     Feeling of Stress : Not at all   Social Connections: Socially Isolated (11/15/2023)    Social Connection and Isolation Panel [NHANES]     Frequency of Communication with Friends and Family: Twice a week     Frequency of Social Gatherings with Friends and Family: Twice a week     Attends Taoism Services: Never     Active Member of Clubs or Organizations: No     Attends Club or Organization Meetings: Never     Marital Status:    Housing Stability: Unknown (11/15/2023)    Housing Stability Vital Sign     Unable to Pay for Housing in the Last Year: No     Unstable Housing in the Last Year: No     Social History     Tobacco Use   Smoking Status Never   Smokeless Tobacco Never         MEDICATIONS & ALLERGIES:   Allergies:   Review of patient's allergies indicates:   Allergen Reactions    Bupropion Rash    Cefdinir Rash       No current facility-administered medications on file prior to encounter.     Current Outpatient Medications on File Prior to Encounter   Medication Sig    albuterol (PROVENTIL/VENTOLIN HFA) 90 mcg/actuation inhaler Inhale 2 puffs into the lungs every 4 (four)  "hours as needed.    benzonatate (TESSALON) 100 MG capsule Take 100 mg by mouth 3 (three) times daily as needed for Cough.    HYDROcodone-acetaminophen (NORCO) 5-325 mg per tablet Take 1 tablet by mouth every 6 (six) hours as needed for Pain.    methIMAzole (TAPAZOLE) 10 MG Tab Take 5 mg by mouth 3 (three) times daily.    ondansetron (ZOFRAN) 4 MG tablet Take 1 tablet (4 mg total) by mouth 2 (two) times daily. for 15 days    oxybutynin (DITROPAN-XL) 10 MG 24 hr tablet Take 10 mg by mouth 2 (two) times a day.    pantoprazole (PROTONIX) 40 MG tablet Take 1 tablet (40 mg total) by mouth once daily.    propranoloL 120 mg Cp24 Take by mouth.       OBJECTIVE:     Vital Signs:  Temp:  [98.2 °F (36.8 °C)] 98.2 °F (36.8 °C)  Pulse:  [61-75] 66  Resp:  [16-18] 18  SpO2:  [99 %-100 %] 99 %  BP: (111-121)/(63-77) 111/70  Body mass index is 24.3 kg/m².     Physical Exam:  General:  No acute distress  HEENT:  Normocephalic, atraumatic  CVS:  RRR, 2+ radial puluses  Resp:  Normal work of breathing on RA, equal rise and fall of the chest  GI: Abdomen soft, non-distended, tender to palpation in RUQ. No rebound tenderness or guarding. Incisions with dermabond intact  MSK:  No muscle atrophy, cyanosis, peripheral edema, moving all extremities spontaneously  Vascular: All extremities WWP  Skin:  No rashes, ulcers, erythema    Laboratory:  Recent Labs     11/21/23  1034 11/22/23  1758   WBC 8.05 7.71   HGB 13.8 13.1   HCT 43.0 42.0     --      Recent Labs     11/21/23  1034 11/22/23  1758    138   K 4.1 3.9   CO2 24 24   BUN 10.0 12.0   CREATININE 0.70 0.72   CALCIUM 9.7 9.5   MG  --  1.70   ALBUMIN 3.7 3.4*   BILITOT 0.6 0.4   AST 19 16   ALKPHOS 130 112   ALT 21 16     Troponin:  No results for input(s): "TROPONINI" in the last 72 hours.  CBC:  Recent Labs     11/21/23  1034 11/22/23  1758   WBC 8.05 7.71   RBC 5.25 5.04   HGB 13.8 13.1   HCT 43.0 42.0     --    MCV 81.9 83.3   MCH 26.3* 26.0*   MCHC 32.1* 31.2* "     CMP:  Recent Labs     11/21/23  1034 11/22/23  1758   CALCIUM 9.7 9.5   ALBUMIN 3.7 3.4*    138   K 4.1 3.9   CO2 24 24   BUN 10.0 12.0   CREATININE 0.70 0.72   ALKPHOS 130 112   ALT 21 16   AST 19 16   BILITOT 0.6 0.4       Microbiology:  Microbiology Results (last 7 days)       Procedure Component Value Units Date/Time    Urine culture [[id number]] Collected: 11/22/23 1728    Order Status: Sent Specimen: Urine Updated: 11/22/23 1830             ASSESSMENT & PLAN:   Apoorva Garcia is a 39 yo F with history of hypothyroidism and acute cholecystitis s/p lap marj on 11/15/23 who presents with RUQ pain. Labs all within normal limits and patient afebrile and hemodynamically stable.     Plan:  - Low suspicion for surgical complication given normal WBC, H/H, bilirubin, and AST/ALT on multiple sets of repeat labs  - Previous CT A/P obtained 11/19 with expected postoperative changes  - Recommend discharge home with adjunct pain medication, gabapentin discussed with ED  - Patient has scheduled follow up in our clinic 11/30     Bandar Fischer MD   [employer] General Surgery PGY 3  11/22/2023  9:24 PM

## 2023-11-24 LAB
BACTERIA UR CULT: NO GROWTH
T3FREE SERPL-MCNC: 2.84 PG/ML (ref 1.58–3.91)
T4 FREE SERPL-MCNC: 1.06 NG/DL (ref 0.7–1.48)

## 2023-11-28 LAB
IGG SERPL-MCNC: NORMAL MG/DL
IGG1 SER-MCNC: NORMAL MG/DL
IGG2 SER-MCNC: NORMAL MG/DL
IGG3 SER-MCNC: NORMAL MG/DL
IGG4 SER-MCNC: NORMAL MG/DL

## 2023-11-30 ENCOUNTER — OFFICE VISIT (OUTPATIENT)
Dept: SURGERY | Facility: CLINIC | Age: 38
End: 2023-11-30
Payer: COMMERCIAL

## 2023-11-30 VITALS
RESPIRATION RATE: 18 BRPM | HEIGHT: 66 IN | WEIGHT: 157.81 LBS | OXYGEN SATURATION: 99 % | BODY MASS INDEX: 25.36 KG/M2 | HEART RATE: 49 BPM | DIASTOLIC BLOOD PRESSURE: 63 MMHG | TEMPERATURE: 99 F | SYSTOLIC BLOOD PRESSURE: 107 MMHG

## 2023-11-30 DIAGNOSIS — K80.00 CALCULUS OF GALLBLADDER WITH ACUTE CHOLECYSTITIS WITHOUT OBSTRUCTION: Primary | ICD-10-CM

## 2023-11-30 DIAGNOSIS — Z90.49 S/P CHOLECYSTECTOMY: ICD-10-CM

## 2023-11-30 PROCEDURE — 99214 OFFICE O/P EST MOD 30 MIN: CPT | Mod: PBBFAC

## 2023-11-30 NOTE — PROGRESS NOTES
[facility] General [facility] Clinic Note    HPI: Belinda Garcia is a 39 yo F with history of hypothyroidism and chronic cholecystitis s/p lap marj on 11/15/23. She presented to the ED on 11/19 with RUQ pain and labs were within normal limits. A CT A/P was performed with expected postoperative changes. She was seen in clinic on 11/21 with persistent RUQ pain. Labs obtained at that time were all within normal limits. She represented to the ED on 11/22 with persistent symptoms and repeat labs at that time were also within normal limits. She presents today for follow up. She reports she is doing much better. Her pain is improved. She is tolerating diet without nausea or vomiting. She is voiding without issues. She does have some diarrhea with fatty foods.     PMH:   Past Medical History:   Diagnosis Date    Thyroid disease       Meds:   Current Outpatient Medications:     albuterol (PROVENTIL/VENTOLIN HFA) 90 mcg/actuation inhaler, Inhale 2 puffs into the lungs every 4 (four) hours as needed., Disp: , Rfl:     benzonatate (TESSALON) 100 MG capsule, Take 100 mg by mouth 3 (three) times daily as needed for Cough., Disp: , Rfl:     gabapentin (NEURONTIN) 300 MG capsule, Take 1 capsule (300 mg total) by mouth 3 (three) times daily. for 10 days, Disp: 30 capsule, Rfl: 0    HYDROcodone-acetaminophen (NORCO) 5-325 mg per tablet, Take 1 tablet by mouth every 6 (six) hours as needed for Pain., Disp: 16 tablet, Rfl: 0    methIMAzole (TAPAZOLE) 10 MG Tab, Take 5 mg by mouth 3 (three) times daily., Disp: , Rfl:     ondansetron (ZOFRAN) 4 MG tablet, Take 1 tablet (4 mg total) by mouth 2 (two) times daily. for 15 days, Disp: 30 tablet, Rfl: 0    oxybutynin (DITROPAN-XL) 10 MG 24 hr tablet, Take 10 mg by mouth 2 (two) times a day., Disp: , Rfl:     pantoprazole (PROTONIX) 40 MG tablet, Take 1 tablet (40 mg total) by mouth once daily., Disp: 30 tablet, Rfl: 11    propranoloL 120 mg Cp24, Take by mouth., Disp: , Rfl:   Allergies:   Review  of patient's allergies indicates:   Allergen Reactions    Bupropion Rash    Cefdinir Rash     Social History:   Social History     Tobacco Use    Smoking status: Never    Smokeless tobacco: Never   Substance Use Topics    Alcohol use: Never    Drug use: Not Currently     Family History: History reviewed. No pertinent family history.  Surgical History:   Past Surgical History:   Procedure Laterality Date    LAPAROSCOPIC CHOLECYSTECTOMY N/A 11/15/2023    Procedure: CHOLECYSTECTOMY, LAPAROSCOPIC;  Surgeon: Tessa Barfield MD;  Location: [facility];  Service: General;  Laterality: N/A;     Review of Systems:  Negative except as mentioned in HPI    Objective:    Vitals:  There were no vitals filed for this visit.     Physical Exam:  Gen: NAD  Neuro: awake, alert, answering questions appropriately  CV: RRR  Resp: non-labored breathing, JOSI  Abd: soft, ND, NT. Lap incisions well healed  Ext: moves all 4 spontaneously and purposefully  Skin: warm, well perfused    Pertinent Labs:  No new labs    Imaging:  No new imaging     Micro/Path/Other:  Gallbladder, cholecystectomy:      - Chronic cholecystitis.        Assessment/Plan:  Belinda Garcia is a 39 yo F with history of hypothyroidism and chronic cholecystitis s/p lap marj on 11/15/23.     - Path reviewed with patient  - Counseled patient on no heavy lifting for 6 weeks postop  - Return to clinic PRCHON Fischer MD   [employer] General Surgery PGY 3  11/30/2023 10:49 AM

## 2023-11-30 NOTE — PROGRESS NOTES
I have reviewed the notes, assessments, and/or procedures performed by the resident, I concur with her/his documentation of Belinda Garcia.     Tessa Barfield MD

## 2023-12-05 ENCOUNTER — LAB VISIT (OUTPATIENT)
Dept: LAB | Facility: HOSPITAL | Age: 38
End: 2023-12-05
Attending: INTERNAL MEDICINE
Payer: COMMERCIAL

## 2023-12-05 DIAGNOSIS — I10 HYPERTENSION, UNSPECIFIED TYPE: ICD-10-CM

## 2023-12-05 DIAGNOSIS — R79.9 ABNORMAL BLOOD CHEMISTRY: Primary | ICD-10-CM

## 2023-12-05 DIAGNOSIS — E05.00 BASEDOW'S DISEASE: ICD-10-CM

## 2023-12-05 DIAGNOSIS — Z51.81 ENCOUNTER FOR THERAPEUTIC DRUG MONITORING: ICD-10-CM

## 2023-12-05 DIAGNOSIS — D84.9 IMMUNODEFICIENCY: ICD-10-CM

## 2023-12-05 PROCEDURE — 82787 IGG 1 2 3 OR 4 EACH: CPT

## 2023-12-05 PROCEDURE — 82784 ASSAY IGA/IGD/IGG/IGM EACH: CPT

## 2023-12-05 PROCEDURE — 36415 COLL VENOUS BLD VENIPUNCTURE: CPT

## 2023-12-06 LAB
IGG SERPL-MCNC: 957 MG/DL (ref 767–1590)
IGG1 SER-MCNC: 519 MG/DL (ref 341–894)
IGG2 SER-MCNC: 354 MG/DL (ref 171–632)
IGG3 SER-MCNC: 31.3 MG/DL (ref 18.4–106)
IGG4 SER-MCNC: 38.2 MG/DL (ref 2.4–121)

## 2024-05-01 ENCOUNTER — HOSPITAL ENCOUNTER (EMERGENCY)
Facility: HOSPITAL | Age: 39
Discharge: HOME | End: 2024-05-01
Attending: FAMILY MEDICINE
Payer: COMMERCIAL

## 2024-05-01 VITALS
HEIGHT: 66 IN | RESPIRATION RATE: 20 BRPM | SYSTOLIC BLOOD PRESSURE: 127 MMHG | DIASTOLIC BLOOD PRESSURE: 63 MMHG | OXYGEN SATURATION: 99 % | HEART RATE: 100 BPM | WEIGHT: 156.19 LBS | BODY MASS INDEX: 25.1 KG/M2 | TEMPERATURE: 101 F

## 2024-05-01 DIAGNOSIS — K52.9 GASTROENTERITIS: Primary | ICD-10-CM

## 2024-05-01 LAB
ALBUMIN SERPL-MCNC: 3.7 G/DL (ref 3.5–5)
ALBUMIN/GLOB SERPL: 1.2 RATIO (ref 1.1–2)
ALP SERPL-CCNC: 105 UNIT/L (ref 40–150)
ALT SERPL-CCNC: 22 UNIT/L (ref 0–55)
APPEARANCE UR: ABNORMAL
AST SERPL-CCNC: 24 UNIT/L (ref 5–34)
B-HCG UR QL: NEGATIVE
BACTERIA #/AREA URNS AUTO: ABNORMAL /HPF
BASOPHILS # BLD AUTO: 0.02 X10(3)/MCL
BASOPHILS NFR BLD AUTO: 0.2 %
BILIRUB SERPL-MCNC: 0.9 MG/DL
BILIRUB UR QL STRIP.AUTO: NEGATIVE
BUN SERPL-MCNC: 11.7 MG/DL (ref 7–18.7)
CALCIUM SERPL-MCNC: 8.9 MG/DL (ref 8.4–10.2)
CHLORIDE SERPL-SCNC: 107 MMOL/L (ref 98–107)
CO2 SERPL-SCNC: 21 MMOL/L (ref 22–29)
COLOR UR AUTO: YELLOW
CREAT SERPL-MCNC: 0.82 MG/DL (ref 0.55–1.02)
CTP QC/QA: YES
EOSINOPHIL # BLD AUTO: 0.05 X10(3)/MCL (ref 0–0.9)
EOSINOPHIL NFR BLD AUTO: 0.6 %
ERYTHROCYTE [DISTWIDTH] IN BLOOD BY AUTOMATED COUNT: 11.8 % (ref 11.5–17)
FLUAV AG UPPER RESP QL IA.RAPID: NOT DETECTED
FLUBV AG UPPER RESP QL IA.RAPID: NOT DETECTED
GFR SERPLBLD CREATININE-BSD FMLA CKD-EPI: >60 MLS/MIN/1.73/M2
GLOBULIN SER-MCNC: 3 GM/DL (ref 2.4–3.5)
GLUCOSE SERPL-MCNC: 118 MG/DL (ref 74–100)
GLUCOSE UR QL STRIP.AUTO: NORMAL
HCT VFR BLD AUTO: 41.9 % (ref 37–47)
HGB BLD-MCNC: 14 G/DL (ref 12–16)
HYALINE CASTS #/AREA URNS LPF: ABNORMAL /LPF
IMM GRANULOCYTES # BLD AUTO: 0.03 X10(3)/MCL (ref 0–0.04)
IMM GRANULOCYTES NFR BLD AUTO: 0.3 %
KETONES UR QL STRIP.AUTO: ABNORMAL
LEUKOCYTE ESTERASE UR QL STRIP.AUTO: 25
LIPASE SERPL-CCNC: 13 U/L
LYMPHOCYTES # BLD AUTO: 0.48 X10(3)/MCL (ref 0.6–4.6)
LYMPHOCYTES NFR BLD AUTO: 5.5 %
MAGNESIUM SERPL-MCNC: 1.7 MG/DL (ref 1.6–2.6)
MCH RBC QN AUTO: 29.5 PG (ref 27–31)
MCHC RBC AUTO-ENTMCNC: 33.4 G/DL (ref 33–36)
MCV RBC AUTO: 88.2 FL (ref 80–94)
MONOCYTES # BLD AUTO: 0.41 X10(3)/MCL (ref 0.1–1.3)
MONOCYTES NFR BLD AUTO: 4.7 %
MUCOUS THREADS URNS QL MICRO: ABNORMAL /LPF
NEUTROPHILS # BLD AUTO: 7.77 X10(3)/MCL (ref 2.1–9.2)
NEUTROPHILS NFR BLD AUTO: 88.7 %
NITRITE UR QL STRIP.AUTO: NEGATIVE
NRBC BLD AUTO-RTO: 0 %
PH UR STRIP.AUTO: 5.5 [PH]
PLATELET # BLD AUTO: 155 X10(3)/MCL (ref 130–400)
PMV BLD AUTO: 12.4 FL (ref 7.4–10.4)
POTASSIUM SERPL-SCNC: 3.6 MMOL/L (ref 3.5–5.1)
PROT SERPL-MCNC: 6.7 GM/DL (ref 6.4–8.3)
PROT UR QL STRIP.AUTO: ABNORMAL
RBC # BLD AUTO: 4.75 X10(6)/MCL (ref 4.2–5.4)
RBC #/AREA URNS AUTO: ABNORMAL /HPF
RBC UR QL AUTO: ABNORMAL
RSV A 5' UTR RNA NPH QL NAA+PROBE: NOT DETECTED
SARS-COV-2 RNA RESP QL NAA+PROBE: NOT DETECTED
SODIUM SERPL-SCNC: 139 MMOL/L (ref 136–145)
SP GR UR STRIP.AUTO: 1.03 (ref 1–1.03)
SQUAMOUS #/AREA URNS LPF: ABNORMAL /HPF
UROBILINOGEN UR STRIP-ACNC: NORMAL
WBC # SPEC AUTO: 8.76 X10(3)/MCL (ref 4.5–11.5)
WBC #/AREA URNS AUTO: ABNORMAL /HPF

## 2024-05-01 PROCEDURE — 81001 URINALYSIS AUTO W/SCOPE: CPT | Performed by: FAMILY MEDICINE

## 2024-05-01 PROCEDURE — 99284 EMERGENCY DEPT VISIT MOD MDM: CPT | Mod: 25

## 2024-05-01 PROCEDURE — 25000003 PHARM REV CODE 250: Performed by: FAMILY MEDICINE

## 2024-05-01 PROCEDURE — 96361 HYDRATE IV INFUSION ADD-ON: CPT

## 2024-05-01 PROCEDURE — 63600175 PHARM REV CODE 636 W HCPCS: Performed by: FAMILY MEDICINE

## 2024-05-01 PROCEDURE — 81025 URINE PREGNANCY TEST: CPT | Performed by: FAMILY MEDICINE

## 2024-05-01 PROCEDURE — 80053 COMPREHEN METABOLIC PANEL: CPT | Performed by: FAMILY MEDICINE

## 2024-05-01 PROCEDURE — 0241U COVID/RSV/FLU A&B PCR: CPT | Performed by: FAMILY MEDICINE

## 2024-05-01 PROCEDURE — 85025 COMPLETE CBC W/AUTO DIFF WBC: CPT | Performed by: FAMILY MEDICINE

## 2024-05-01 PROCEDURE — 96374 THER/PROPH/DIAG INJ IV PUSH: CPT

## 2024-05-01 PROCEDURE — 83735 ASSAY OF MAGNESIUM: CPT | Performed by: FAMILY MEDICINE

## 2024-05-01 PROCEDURE — 96372 THER/PROPH/DIAG INJ SC/IM: CPT | Mod: 59 | Performed by: FAMILY MEDICINE

## 2024-05-01 PROCEDURE — 83690 ASSAY OF LIPASE: CPT | Performed by: FAMILY MEDICINE

## 2024-05-01 PROCEDURE — 96375 TX/PRO/DX INJ NEW DRUG ADDON: CPT

## 2024-05-01 RX ORDER — LOPERAMIDE HYDROCHLORIDE 2 MG/1
4 CAPSULE ORAL
Status: COMPLETED | OUTPATIENT
Start: 2024-05-01 | End: 2024-05-01

## 2024-05-01 RX ORDER — ONDANSETRON HYDROCHLORIDE 2 MG/ML
4 INJECTION, SOLUTION INTRAVENOUS
Status: COMPLETED | OUTPATIENT
Start: 2024-05-01 | End: 2024-05-01

## 2024-05-01 RX ORDER — IBUPROFEN 600 MG/1
600 TABLET ORAL EVERY 6 HOURS PRN
Qty: 40 TABLET | Refills: 0 | Status: SHIPPED | OUTPATIENT
Start: 2024-05-01 | End: 2024-05-11

## 2024-05-01 RX ORDER — LOPERAMIDE HYDROCHLORIDE 2 MG/1
2 CAPSULE ORAL 4 TIMES DAILY PRN
Qty: 12 CAPSULE | Refills: 0 | Status: SHIPPED | OUTPATIENT
Start: 2024-05-01 | End: 2024-05-11

## 2024-05-01 RX ORDER — KETOROLAC TROMETHAMINE 30 MG/ML
30 INJECTION, SOLUTION INTRAMUSCULAR; INTRAVENOUS
Status: COMPLETED | OUTPATIENT
Start: 2024-05-01 | End: 2024-05-01

## 2024-05-01 RX ORDER — DICYCLOMINE HYDROCHLORIDE 10 MG/1
10 CAPSULE ORAL EVERY 6 HOURS PRN
Qty: 20 CAPSULE | Refills: 0 | Status: SHIPPED | OUTPATIENT
Start: 2024-05-01 | End: 2024-05-11

## 2024-05-01 RX ORDER — ACETAMINOPHEN 325 MG/1
650 TABLET ORAL
Status: COMPLETED | OUTPATIENT
Start: 2024-05-01 | End: 2024-05-01

## 2024-05-01 RX ORDER — DICYCLOMINE HYDROCHLORIDE 10 MG/ML
20 INJECTION INTRAMUSCULAR
Status: COMPLETED | OUTPATIENT
Start: 2024-05-01 | End: 2024-05-01

## 2024-05-01 RX ORDER — ONDANSETRON 4 MG/1
4 TABLET, ORALLY DISINTEGRATING ORAL EVERY 6 HOURS PRN
Qty: 10 TABLET | Refills: 0 | Status: SHIPPED | OUTPATIENT
Start: 2024-05-01 | End: 2024-05-06

## 2024-05-01 RX ADMIN — ACETAMINOPHEN 650 MG: 325 TABLET, FILM COATED ORAL at 10:05

## 2024-05-01 RX ADMIN — ONDANSETRON 4 MG: 2 INJECTION INTRAMUSCULAR; INTRAVENOUS at 09:05

## 2024-05-01 RX ADMIN — KETOROLAC TROMETHAMINE 30 MG: 30 INJECTION, SOLUTION INTRAMUSCULAR; INTRAVENOUS at 09:05

## 2024-05-01 RX ADMIN — DICYCLOMINE HYDROCHLORIDE 20 MG: 20 INJECTION, SOLUTION INTRAMUSCULAR at 09:05

## 2024-05-01 RX ADMIN — LOPERAMIDE HYDROCHLORIDE 4 MG: 2 CAPSULE ORAL at 10:05

## 2024-05-01 RX ADMIN — SODIUM CHLORIDE, POTASSIUM CHLORIDE, SODIUM LACTATE AND CALCIUM CHLORIDE 1000 ML: 600; 310; 30; 20 INJECTION, SOLUTION INTRAVENOUS at 09:05

## 2024-05-02 NOTE — ED PROVIDER NOTES
Encounter Date: 5/1/2024       History     Chief Complaint   Patient presents with    Chills    Abdominal Pain    Vomiting    Nausea    Diarrhea     Patient is a 39-year-old female presents emergency room with complaints of fever, abdominal cramps, headache, nausea, vomiting, diarrhea which began yesterday.  Patient reports she was currently being treated for H pylori with oral antibiotics which she began 8 days ago.  Was tolerating this well until she began having these symptoms yesterday.    The history is provided by the patient.     Review of patient's allergies indicates:   Allergen Reactions    Bupropion Rash    Cefdinir Rash     Past Medical History:   Diagnosis Date    Thyroid disease      Past Surgical History:   Procedure Laterality Date    LAPAROSCOPIC CHOLECYSTECTOMY N/A 11/15/2023    Procedure: CHOLECYSTECTOMY, LAPAROSCOPIC;  Surgeon: Tessa Barfield MD;  Location: [facility];  Service: General;  Laterality: N/A;     No family history on file.  Social History     Tobacco Use    Smoking status: Never    Smokeless tobacco: Never   Substance Use Topics    Alcohol use: Never    Drug use: Not Currently     Review of Systems   Constitutional:  Positive for chills, fatigue and fever.   HENT:  Negative for ear pain, rhinorrhea and sore throat.    Eyes:  Negative for photophobia and pain.   Respiratory:  Negative for cough, shortness of breath and wheezing.    Cardiovascular:  Negative for chest pain.   Gastrointestinal:  Positive for abdominal pain, diarrhea, nausea and vomiting.   Genitourinary:  Negative for dysuria.   Neurological:  Negative for dizziness, weakness and headaches.   All other systems reviewed and are negative.      Physical Exam     Initial Vitals [05/01/24 2038]   BP Pulse Resp Temp SpO2   110/68 109 20 (!) 100.9 °F (38.3 °C) 100 %      MAP       --         Physical Exam    Nursing note and vitals reviewed.  Constitutional: She appears well-developed and well-nourished. No distress.   HENT:    Head: Normocephalic and atraumatic.   Mouth/Throat: Oropharynx is clear and moist.   Eyes: Conjunctivae and EOM are normal. Pupils are equal, round, and reactive to light.   Neck: Neck supple.   Normal range of motion.  Cardiovascular:  Normal rate, regular rhythm, normal heart sounds and intact distal pulses.     Exam reveals no gallop and no friction rub.       No murmur heard.  Pulmonary/Chest: No respiratory distress. She has no wheezes. She has no rhonchi. She has no rales.   Abdominal: Abdomen is soft. Bowel sounds are normal. She exhibits no distension. There is abdominal tenderness.   Mild generalized abdominal tenderness without rebound or guarding. There is no rebound and no guarding.   Musculoskeletal:         General: Normal range of motion.      Cervical back: Normal range of motion and neck supple.     Neurological: She is alert and oriented to person, place, and time.   Skin: Skin is warm and dry. Capillary refill takes less than 2 seconds. No erythema.   Psychiatric: She has a normal mood and affect. Her behavior is normal. Judgment and thought content normal.         ED Course   Procedures  Labs Reviewed   COMPREHENSIVE METABOLIC PANEL - Abnormal; Notable for the following components:       Result Value    Carbon Dioxide 21 (*)     Glucose Level 118 (*)     All other components within normal limits   URINALYSIS, REFLEX TO URINE CULTURE - Abnormal; Notable for the following components:    Appearance, UA Turbid (*)     Protein, UA Trace (*)     Ketones, UA Trace (*)     Blood, UA Trace (*)     Leukocyte Esterase, UA 25 (*)     Bacteria, UA Trace (*)     Squamous Epithelial Cells, UA Many (*)     Mucous, UA Trace (*)     RBC, UA 6-10 (*)     All other components within normal limits   CBC WITH DIFFERENTIAL - Abnormal; Notable for the following components:    MPV 12.4 (*)     Lymph # 0.48 (*)     All other components within normal limits   LIPASE - Normal   MAGNESIUM - Normal   COVID/RSV/FLU A&B PCR -  Normal    Narrative:     The Xpert Xpress SARS-CoV-2/FLU/RSV plus is a rapid, multiplexed real-time PCR test intended for the simultaneous qualitative detection and differentiation of SARS-CoV-2, Influenza A, Influenza B, and respiratory syncytial virus (RSV) viral RNA in either nasopharyngeal swab or nasal swab specimens.         CBC W/ AUTO DIFFERENTIAL    Narrative:     The following orders were created for panel order CBC Auto Differential.  Procedure                               Abnormality         Status                     ---------                               -----------         ------                     CBC with Differential[[id number]]       Abnormal            Final result                 Please view results for these tests on the individual orders.   POCT URINE PREGNANCY          Imaging Results    None          Medications   lactated ringers bolus 1,000 mL (0 mLs Intravenous Stopped 5/1/24 2206)   ondansetron injection 4 mg (4 mg Intravenous Given 5/1/24 2125)   ketorolac injection 30 mg (30 mg Intravenous Given 5/1/24 2125)   dicyclomine injection 20 mg (20 mg Intramuscular Given 5/1/24 2125)   acetaminophen tablet 650 mg (650 mg Oral Given 5/1/24 2210)   loperamide capsule 4 mg (4 mg Oral Given 5/1/24 2226)     Medical Decision Making  Patient is a 39-year-old female presents emergency room complaints of nausea, vomiting, diarrhea, headaches, body aches which began acutely yesterday.  Currently nontoxic appearing.  Will obtain laboratory evaluation including CBC CMP lipase magnesium urinalysis.  Will administer IV fluids, and provide symptomatic treatment.  Will continue to monitor     Differential diagnosis:  Viral gastroenteritis, electrolyte abnormality, dehydration, GISELLE, COVID-19, influenza    Amount and/or Complexity of Data Reviewed  Labs: ordered. Decision-making details documented in ED Course.    Risk  OTC drugs.  Prescription drug management.               ED Course as of 05/01/24 9100    Wed May 01, 2024   2136 On laboratory evaluation, white blood cell count of 8.76.  Hemoglobin 14.0, hematocrit 41.9, platelet count 155.  Patient has 88.7% neutrophils on differential.  On CMP, normal renal function with a creatinine of 0.82.  Potassium of 3.6 and magnesium of 1.7.  Currently receiving IV fluids.  Will continue to monitor. [MW]   2201 Squamous Epithelial Cells, UA(!): Many [MW]   2201 Bacteria, UA(!): Trace [MW]   2201 Leukocyte Esterase, UA(!): 25 [MW]   2201 Blood, UA(!): Trace [MW]   2201 Ketones, UA(!): Trace [MW]   2201 Protein, UA(!): Trace [MW]   2201 Appearance, UA(!): Turbid  On urinalysis, noted to have many squamous epithelial cells with trace bacteria, likely indicating a contaminated sample.  No evidence of acute cystitis. [MW]   2221 Influenza A, Molecular: Not Detected [MW]   2221 Influenza B, Molecular: Not Detected [MW]   2221 RSV Ag by Molecular Method: Not Detected [MW]   2221 SARS-CoV2 (COVID-19) Qualitative PCR: Not Detected [MW]   2224 Patient feeling improved; negative for COVID/Influenza.  Stable for discharge to home.  ER precautions for any acute worsening. [MW]      ED Course User Index  [MW] Baljinder Lake MD                           Clinical Impression:  Final diagnoses:  [K52.9] Gastroenteritis (Primary)          ED Disposition Condition    Discharge Stable          ED Prescriptions       Medication Sig Dispense Start Date End Date Auth. Provider    dicyclomine (BENTYL) 10 MG capsule Take 1 capsule (10 mg total) by mouth every 6 (six) hours as needed (abdominal cramping). 20 capsule 5/1/2024 5/11/2024 Baljinder Lake MD    ibuprofen (ADVIL,MOTRIN) 600 MG tablet Take 1 tablet (600 mg total) by mouth every 6 (six) hours as needed for Pain. 40 tablet 5/1/2024 5/11/2024 Baljinder Lake MD    ondansetron (ZOFRAN-ODT) 4 MG TbDL Take 1 tablet (4 mg total) by mouth every 6 (six) hours as needed (nausea vomiting). 10 tablet 5/1/2024 5/6/2024 Jaya  Baljinder KING MD    loperamide (IMODIUM) 2 mg capsule Take 1 capsule (2 mg total) by mouth 4 (four) times daily as needed for Diarrhea. 12 capsule 5/1/2024 5/11/2024 Baljinder Lake MD          Follow-up Information       Follow up With Specialties Details Why Contact Info    [facility] Dept Emergency Medicine  As needed, If symptoms worsen [address] [facility] [place] [address] [phone]    Primary Care Physician  In 5 days               Baljinder Lake MD  05/01/24 8743

## 2024-07-15 ENCOUNTER — HOSPITAL ENCOUNTER (EMERGENCY)
Facility: HOSPITAL | Age: 39
Discharge: HOME | End: 2024-07-16
Attending: INTERNAL MEDICINE
Payer: COMMERCIAL

## 2024-07-15 DIAGNOSIS — G43.E01 CHRONIC MIGRAINE WITH AURA AND WITH STATUS MIGRAINOSUS, NOT INTRACTABLE: Primary | ICD-10-CM

## 2024-07-15 LAB
BACTERIA #/AREA URNS AUTO: ABNORMAL /HPF
BILIRUB UR QL STRIP.AUTO: NEGATIVE
CLARITY UR: CLEAR
COLOR UR AUTO: ABNORMAL
GLUCOSE UR QL STRIP: NORMAL
HGB UR QL STRIP: NEGATIVE
HYALINE CASTS #/AREA URNS LPF: ABNORMAL /LPF
KETONES UR QL STRIP: NEGATIVE
LEUKOCYTE ESTERASE UR QL STRIP: NEGATIVE
NITRITE UR QL STRIP: NEGATIVE
PH UR STRIP: 7 [PH]
PROT UR QL STRIP: NEGATIVE
RBC #/AREA URNS AUTO: ABNORMAL /HPF
SP GR UR STRIP.AUTO: 1.02 (ref 1–1.03)
SQUAMOUS #/AREA URNS LPF: ABNORMAL /HPF
TSH SERPL-ACNC: 1.19 UIU/ML (ref 0.35–4.94)
UROBILINOGEN UR STRIP-ACNC: NORMAL
WBC #/AREA URNS AUTO: ABNORMAL /HPF

## 2024-07-15 PROCEDURE — 96375 TX/PRO/DX INJ NEW DRUG ADDON: CPT

## 2024-07-15 PROCEDURE — 96374 THER/PROPH/DIAG INJ IV PUSH: CPT

## 2024-07-15 PROCEDURE — 84443 ASSAY THYROID STIM HORMONE: CPT | Performed by: INTERNAL MEDICINE

## 2024-07-15 PROCEDURE — 63600175 PHARM REV CODE 636 W HCPCS: Performed by: INTERNAL MEDICINE

## 2024-07-15 PROCEDURE — 96361 HYDRATE IV INFUSION ADD-ON: CPT

## 2024-07-15 PROCEDURE — 99284 EMERGENCY DEPT VISIT MOD MDM: CPT | Mod: 25

## 2024-07-15 PROCEDURE — 81001 URINALYSIS AUTO W/SCOPE: CPT | Performed by: INTERNAL MEDICINE

## 2024-07-15 RX ORDER — METOCLOPRAMIDE HYDROCHLORIDE 5 MG/ML
10 INJECTION INTRAMUSCULAR; INTRAVENOUS
Status: COMPLETED | OUTPATIENT
Start: 2024-07-15 | End: 2024-07-15

## 2024-07-15 RX ORDER — DIPHENHYDRAMINE HYDROCHLORIDE 50 MG/ML
25 INJECTION INTRAMUSCULAR; INTRAVENOUS
Status: COMPLETED | OUTPATIENT
Start: 2024-07-15 | End: 2024-07-15

## 2024-07-15 RX ORDER — SUMATRIPTAN SUCCINATE 25 MG/1
25 TABLET ORAL ONCE AS NEEDED
Qty: 12 TABLET | Refills: 0 | Status: SHIPPED | OUTPATIENT
Start: 2024-07-15 | End: 2024-07-15

## 2024-07-15 RX ORDER — KETOROLAC TROMETHAMINE 30 MG/ML
15 INJECTION, SOLUTION INTRAMUSCULAR; INTRAVENOUS
Status: COMPLETED | OUTPATIENT
Start: 2024-07-15 | End: 2024-07-15

## 2024-07-15 RX ADMIN — SODIUM CHLORIDE, POTASSIUM CHLORIDE, SODIUM LACTATE AND CALCIUM CHLORIDE 500 ML: 600; 310; 30; 20 INJECTION, SOLUTION INTRAVENOUS at 11:07

## 2024-07-15 RX ADMIN — KETOROLAC TROMETHAMINE 15 MG: 30 INJECTION, SOLUTION INTRAMUSCULAR; INTRAVENOUS at 11:07

## 2024-07-15 RX ADMIN — METOCLOPRAMIDE 10 MG: 5 INJECTION, SOLUTION INTRAMUSCULAR; INTRAVENOUS at 11:07

## 2024-07-15 RX ADMIN — DIPHENHYDRAMINE HYDROCHLORIDE 25 MG: 50 INJECTION INTRAMUSCULAR; INTRAVENOUS at 11:07

## 2024-07-16 VITALS
OXYGEN SATURATION: 100 % | BODY MASS INDEX: 25.48 KG/M2 | SYSTOLIC BLOOD PRESSURE: 118 MMHG | DIASTOLIC BLOOD PRESSURE: 78 MMHG | TEMPERATURE: 99 F | RESPIRATION RATE: 18 BRPM | HEART RATE: 69 BPM | WEIGHT: 157.88 LBS

## 2024-07-16 NOTE — ED PROVIDER NOTES
Encounter Date: 7/15/2024       History     Chief Complaint   Patient presents with    Headache    Nausea     C/o headache and nausea x 4 weeks. States pcp gave cortisone injections in head on 7/10 but no relief.      Presents with headache for the last month. States Hx of migraines, usually relief by her Exedrin, but not this time. A1 with LMP 2 weeks ago. Hx of hyperthyroidism    The history is provided by the patient.     Review of patient's allergies indicates:   Allergen Reactions    Bupropion Rash    Cefdinir Rash     Past Medical History:   Diagnosis Date    Thyroid disease      Past Surgical History:   Procedure Laterality Date    LAPAROSCOPIC CHOLECYSTECTOMY N/A 11/15/2023    Procedure: CHOLECYSTECTOMY, LAPAROSCOPIC;  Surgeon: Tessa Barfield MD;  Location: [facility];  Service: General;  Laterality: N/A;     No family history on file.  Social History     Tobacco Use    Smoking status: Never    Smokeless tobacco: Never   Substance Use Topics    Alcohol use: Never    Drug use: Not Currently     Review of Systems   Neurological:  Positive for headaches.   All other systems reviewed and are negative.      Physical Exam     Initial Vitals [07/15/24 2204]   BP Pulse Resp Temp SpO2   125/75 62 20 98.2 °F (36.8 °C) 100 %      MAP       --         Physical Exam    Nursing note and vitals reviewed.  Constitutional: She appears well-developed and well-nourished. No distress.   HENT:   Head: Normocephalic and atraumatic.   Nose: Nose normal.   Mouth/Throat: Oropharynx is clear and moist. No oropharyngeal exudate.   Eyes: Conjunctivae and EOM are normal. Pupils are equal, round, and reactive to light.   Neck: Neck supple. No thyromegaly present. No JVD present.   Normal range of motion.  Cardiovascular:  Normal rate, regular rhythm, normal heart sounds and intact distal pulses.           Pulmonary/Chest: Breath sounds normal.   Abdominal: Abdomen is soft. Bowel sounds are normal. She exhibits no distension. There is  no abdominal tenderness. There is no rebound and no guarding.   Musculoskeletal:         General: No edema. Normal range of motion.      Cervical back: Normal range of motion and neck supple.     Neurological: She is alert and oriented to person, place, and time. She has normal strength. No cranial nerve deficit. GCS score is 15. GCS eye subscore is 4. GCS verbal subscore is 5. GCS motor subscore is 6.   Skin: Skin is warm and dry. No rash noted.   Psychiatric: Her behavior is normal.         ED Course   Procedures  Labs Reviewed   URINALYSIS, REFLEX TO URINE CULTURE - Abnormal; Notable for the following components:       Result Value    Squamous Epithelial Cells, UA Occ (*)     All other components within normal limits   TSH - Normal   EXTRA TUBES    Narrative:     The following orders were created for panel order EXTRA TUBES.  Procedure                               Abnormality         Status                     ---------                               -----------         ------                     Lavender Top Hold[[id number]]                                                          Gold Top Hold[[id number]]                                                                Please view results for these tests on the individual orders.   LAVENDER TOP HOLD   GOLD TOP HOLD   POCT URINE PREGNANCY          Imaging Results    None          Medications   lactated ringers bolus 500 mL (500 mLs Intravenous New Bag 7/15/24 2318)   metoclopramide injection 10 mg (10 mg Intravenous Given 7/15/24 2310)   diphenhydrAMINE injection 25 mg (25 mg Intravenous Given 7/15/24 2310)   ketorolac injection 15 mg (15 mg Intravenous Given 7/15/24 2310)     Medical Decision Making  Amount and/or Complexity of Data Reviewed  Labs: ordered. Decision-making details documented in ED Course.    Risk  Prescription drug management.      Additional MDM:   Differential Diagnosis:   Tensional headache, cluster headache, otitis, dental infection, SAH,  shingles, encephalitis, meningitis, among others                                        Clinical Impression:  Final diagnoses:  [G43.E01] Chronic migraine with aura and with status migrainosus, not intractable (Primary)          ED Disposition Condition    Discharge Stable          ED Prescriptions       Medication Sig Dispense Start Date End Date Auth. Provider    sumatriptan (IMITREX) 25 MG Tab (Expires today) Take 1 tablet (25 mg total) by mouth once as needed (Migraine, may repeat in 2 hrs if headache persistance). 12 tablet 7/15/2024 7/15/2024 Niall West MD          Follow-up Information       Follow up With Specialties Details Why Contact Info Additional Information    Eliseo Gaytan MD Internal Medicine In 2 weeks  [address]  [address]  [place] [facility] [place] [address]  [phone]       [facility] Emergency Medicine  If symptoms worsen [address] [address] [phone]     [facility] Neurology Schedule an appointment as soon as possible for a visit in 2 months  [address] [address] [phone] Entrance 1             Niall West MD  07/15/24 4784

## (undated) DEVICE — ADHESIVE DERMABOND ADVANCED

## (undated) DEVICE — HANDLE DEVON RIGID OR LIGHT

## (undated) DEVICE — ELECTRODE MEGADYNE L-WIRE 33CM

## (undated) DEVICE — BAG TISS RETRV MONARCH 10MM

## (undated) DEVICE — SOL IRRI STRL WATER 1000ML

## (undated) DEVICE — NDL HYPO REG 25G X 1 1/2

## (undated) DEVICE — GLOVE SIGNATURE MICRO LTX 6.5

## (undated) DEVICE — POSITIONER HEEL FOAM CONVOLTD

## (undated) DEVICE — NDL PNEUMO INSUFFLATI 120MM

## (undated) DEVICE — SUT MCRYL PLUS 4-0 PS2 27IN

## (undated) DEVICE — MANIFOLD 4 PORT

## (undated) DEVICE — SUT 0 VICRYL / UR6 (J603)

## (undated) DEVICE — GLOVE SIGNATURE MICRO LTX 7

## (undated) DEVICE — APPLIER CLIP ENDO MED/LG 10MM

## (undated) DEVICE — GLOVE SIGNATURE ESSNTL LTX 7

## (undated) DEVICE — TROCAR ENDOPATH XCEL 5X100MM

## (undated) DEVICE — TROCAR ENDOPATH XCEL 12X100MM

## (undated) DEVICE — GLOVE SENSICARE PI GRN 7.5

## (undated) DEVICE — TOWEL OR DISP STRL BLUE 4/PK

## (undated) DEVICE — GLOVE SIGNATURE ESSNTL LTX 6.5

## (undated) DEVICE — TROCAR ENDOPATH ECEL

## (undated) DEVICE — GLOVE SENSICARE PI GRN 6.5

## (undated) DEVICE — TROCAR ENDOPATH EXCEL DILATING

## (undated) DEVICE — KIT LAPAROSCOPY UNIVERSITY

## (undated) DEVICE — GOWN POLY REINF BRTH SLV XL

## (undated) DEVICE — KIT SURGICAL TURNOVER

## (undated) DEVICE — SYS SEE SHARP SCOPE ANTIFOG

## (undated) DEVICE — APPLICATOR CHLORAPREP ORN 26ML

## (undated) DEVICE — GLOVE SENSICARE PI GRN 7